# Patient Record
Sex: FEMALE | Race: BLACK OR AFRICAN AMERICAN | Employment: UNEMPLOYED | URBAN - METROPOLITAN AREA
[De-identification: names, ages, dates, MRNs, and addresses within clinical notes are randomized per-mention and may not be internally consistent; named-entity substitution may affect disease eponyms.]

---

## 2017-06-29 ENCOUNTER — HOSPITAL ENCOUNTER (INPATIENT)
Age: 22
LOS: 3 days | Discharge: HOME | DRG: 881 | End: 2017-07-03
Attending: EMERGENCY MEDICINE | Admitting: PSYCHIATRY & NEUROLOGY
Payer: COMMERCIAL

## 2017-06-29 DIAGNOSIS — R45.851 SUICIDAL IDEATION: ICD-10-CM

## 2017-06-29 DIAGNOSIS — F32.89 OTHER DEPRESSION: Primary | ICD-10-CM

## 2017-06-29 PROBLEM — F32.A DEPRESSION: Status: ACTIVE | Noted: 2017-06-29

## 2017-06-29 LAB
ALBUMIN SERPL BCP-MCNC: 3.5 G/DL (ref 3.5–5)
ALBUMIN/GLOB SERPL: 0.8 {RATIO} (ref 1.1–2.2)
ALP SERPL-CCNC: 67 U/L (ref 45–117)
ALT SERPL-CCNC: 19 U/L (ref 12–78)
AMPHET UR QL SCN: NEGATIVE
ANION GAP BLD CALC-SCNC: 7 MMOL/L (ref 5–15)
AST SERPL W P-5'-P-CCNC: 16 U/L (ref 15–37)
BARBITURATES UR QL SCN: NEGATIVE
BASOPHILS # BLD AUTO: 0 K/UL (ref 0–0.1)
BASOPHILS # BLD: 0 % (ref 0–1)
BENZODIAZ UR QL: NEGATIVE
BILIRUB SERPL-MCNC: 0.3 MG/DL (ref 0.2–1)
BUN SERPL-MCNC: 12 MG/DL (ref 6–20)
BUN/CREAT SERPL: 16 (ref 12–20)
CALCIUM SERPL-MCNC: 9.4 MG/DL (ref 8.5–10.1)
CANNABINOIDS UR QL SCN: NEGATIVE
CHLORIDE SERPL-SCNC: 104 MMOL/L (ref 97–108)
CO2 SERPL-SCNC: 27 MMOL/L (ref 21–32)
COCAINE UR QL SCN: NEGATIVE
CREAT SERPL-MCNC: 0.75 MG/DL (ref 0.55–1.02)
DRUG SCRN COMMENT,DRGCM: NORMAL
EOSINOPHIL # BLD: 0.1 K/UL (ref 0–0.4)
EOSINOPHIL NFR BLD: 1 % (ref 0–7)
ERYTHROCYTE [DISTWIDTH] IN BLOOD BY AUTOMATED COUNT: 12.2 % (ref 11.5–14.5)
ETHANOL SERPL-MCNC: <10 MG/DL
GLOBULIN SER CALC-MCNC: 4.3 G/DL (ref 2–4)
GLUCOSE SERPL-MCNC: 99 MG/DL (ref 65–100)
HCT VFR BLD AUTO: 39.3 % (ref 35–47)
HGB BLD-MCNC: 13.8 G/DL (ref 11.5–16)
LYMPHOCYTES # BLD AUTO: 40 % (ref 12–49)
LYMPHOCYTES # BLD: 2.2 K/UL (ref 0.8–3.5)
MCH RBC QN AUTO: 30.9 PG (ref 26–34)
MCHC RBC AUTO-ENTMCNC: 35.1 G/DL (ref 30–36.5)
MCV RBC AUTO: 87.9 FL (ref 80–99)
METHADONE UR QL: NEGATIVE
MONOCYTES # BLD: 0.5 K/UL (ref 0–1)
MONOCYTES NFR BLD AUTO: 9 % (ref 5–13)
NEUTS SEG # BLD: 2.7 K/UL (ref 1.8–8)
NEUTS SEG NFR BLD AUTO: 50 % (ref 32–75)
OPIATES UR QL: NEGATIVE
PCP UR QL: NEGATIVE
PLATELET # BLD AUTO: 261 K/UL (ref 150–400)
POTASSIUM SERPL-SCNC: 4 MMOL/L (ref 3.5–5.1)
PROT SERPL-MCNC: 7.8 G/DL (ref 6.4–8.2)
RBC # BLD AUTO: 4.47 M/UL (ref 3.8–5.2)
SODIUM SERPL-SCNC: 138 MMOL/L (ref 136–145)
WBC # BLD AUTO: 5.6 K/UL (ref 3.6–11)

## 2017-06-29 PROCEDURE — 85025 COMPLETE CBC W/AUTO DIFF WBC: CPT | Performed by: EMERGENCY MEDICINE

## 2017-06-29 PROCEDURE — 36415 COLL VENOUS BLD VENIPUNCTURE: CPT | Performed by: EMERGENCY MEDICINE

## 2017-06-29 PROCEDURE — 80053 COMPREHEN METABOLIC PANEL: CPT | Performed by: EMERGENCY MEDICINE

## 2017-06-29 PROCEDURE — 99285 EMERGENCY DEPT VISIT HI MDM: CPT

## 2017-06-29 PROCEDURE — 81001 URINALYSIS AUTO W/SCOPE: CPT | Performed by: EMERGENCY MEDICINE

## 2017-06-29 PROCEDURE — 80307 DRUG TEST PRSMV CHEM ANLYZR: CPT | Performed by: EMERGENCY MEDICINE

## 2017-06-29 PROCEDURE — 84443 ASSAY THYROID STIM HORMONE: CPT | Performed by: PSYCHIATRY & NEUROLOGY

## 2017-06-29 PROCEDURE — 90791 PSYCH DIAGNOSTIC EVALUATION: CPT

## 2017-06-29 PROCEDURE — 81025 URINE PREGNANCY TEST: CPT

## 2017-06-29 RX ORDER — THERA TABS 400 MCG
1 TAB ORAL DAILY
COMMUNITY
End: 2020-02-11

## 2017-06-29 NOTE — IP AVS SNAPSHOT
Current Discharge Medication List  
  
START taking these medications Dose & Instructions Dispensing Information Comments Morning Noon Evening Bedtime  
 hydrOXYzine HCl 10 mg tablet Commonly known as:  ATARAX Dose:  10 mg Take 1 Tab by mouth every four (4) hours as needed (Anxiety) for up to 10 days. Indications: anxiety Quantity:  30 Tab Refills:  0  
     
   
   
   
  
 sertraline 25 mg tablet Commonly known as:  ZOLOFT Your last dose was:  7/3/17 Your next dose is:  7/4/17 Dose:  25 mg Take 1 Tab by mouth daily. Indications: ANXIETY WITH DEPRESSION Quantity:  30 Tab Refills:  0 CONTINUE these medications which have NOT CHANGED Dose & Instructions Dispensing Information Comments Morning Noon Evening Bedtime  
 therapeutic multivitamin tablet Commonly known as:  [facility] Dose:  1 Tab Take 1 Tab by mouth daily. Indications: VITAMIN DEFICIENCY PREVENTION Refills:  0 Where to Get Your Medications Information on where to get these meds will be given to you by the nurse or doctor. ! Ask your nurse or doctor about these medications  
  hydrOXYzine HCl 10 mg tablet  
 sertraline 25 mg tablet

## 2017-06-29 NOTE — IP AVS SNAPSHOT
[facility] [facility] [address] [place] [address] 
[phone] Patient: Obey Thomas MRN: [id number] NVI:[id number]/99/[id number] You are allergic to the following No active allergies Recent Documentation Height Weight BMI Smoking Status 1.575 m 82.3 kg 33.18 kg/m2 Never Smoker Emergency Contacts Name Discharge Info Relation Home Work Mobile Elvis Shawn About your hospitalization You were admitted on:  June 30, 2017 You last received care in the:  [address] You were discharged on:  July 3, 2017 Unit phone number:  [phone] Why you were hospitalized Your primary diagnosis was:  Depression Providers Seen During Your Hospitalizations Provider Role Specialty Primary office phone Floyd Remy MD Attending Provider Emergency Medicine [phone] Mary Green MD Attending Provider Psychiatry [phone] Winnie Ray MD Attending Provider Psychiatry [phone] Your Primary Care Physician (PCP) Primary Care Physician Office Phone Office Fax NONE ** None ** ** None ** Follow-up Information Follow up With Details Comments Contact Info Dara Dorsey  Please call to schedule an appointment with your therapist. The office wishes to schedule appointments directly with the client. [facility] [address] 
[phone] [place], [place] On 7/5/2017 You have at 10:00am appointment with a physician to become enrolled in student health services. After this appointment, you will be scheduled to meet with a psychiatrist in the practice. [address] [facility] [address] [place] [address], [address] 
[phone] None   None (395) Patient stated that they have no PCP Current Discharge Medication List  
  
START taking these medications Dose & Instructions Dispensing Information Comments Morning Noon Evening Bedtime  
 hydrOXYzine HCl 10 mg tablet Commonly known as:  ATARAX Dose:  10 mg Take 1 Tab by mouth every four (4) hours as needed (Anxiety) for up to 10 days. Indications: anxiety Quantity:  30 Tab Refills:  0  
     
   
   
   
  
 sertraline 25 mg tablet Commonly known as:  ZOLOFT Your last dose was:  7/3/17 Your next dose is:  17 Dose:  25 mg Take 1 Tab by mouth daily. Indications: ANXIETY WITH DEPRESSION Quantity:  30 Tab Refills:  0 CONTINUE these medications which have NOT CHANGED Dose & Instructions Dispensing Information Comments Morning Noon Evening Bedtime  
 therapeutic multivitamin tablet Commonly known as:  [facility] Dose:  1 Tab Take 1 Tab by mouth daily. Indications: VITAMIN DEFICIENCY PREVENTION Refills:  0 Where to Get Your Medications Information on where to get these meds will be given to you by the nurse or doctor. ! Ask your nurse or doctor about these medications  
  hydrOXYzine HCl 10 mg tablet  
 sertraline 25 mg tablet Discharge Instructions [address] [facility] : 1995 MRN: [id number] The patient Cheyenne Rai exhibits the ability to control behavior in a less restrictive environment. Patient's level of functioning is improving. No assaultive/destructive behavior has been observed for the past 24 hours. No suicidal/homicidal threat or behavior has been observed for the past 24 hours. There is no evidence of serious medication side effects. Patient has not been in physical or protective restraints for at least the past 24 hours. If weapons involved, how are they secured? No weapons involved. Is patient aware of and in agreement with discharge plan? Yes Arrangements for medication:  Prescriptions given to patient. Copy of discharge instructions to  provider?:  Yes, [facility] ([phone]) Arrangements for transportation home:  Lex Blair to friend's home Keep all follow up appointments as scheduled, continue to take prescribed medications per physician instructions. Mental health crisis number:  162 or your local mental health crisis line number at [phone] [facility] or [phone] ([place]). DISCHARGE SUMMARY from Nurse The following personal items are in your possession at time of discharge: 
 
Dental Appliances: None Visual Aid: None Home Medications: None Jewelry: None Clothing: Undergarments (8 undies) Other Valuables: Personal toiletries, Other (comment) (notebook,laquita, toothbr/paste,face wipes) Personal Items Sent to Safe: none PATIENT INSTRUCTIONS: 
 
 
What to do at Home: 
Recommended activity: Activity as tolerated. If you experience any of the following symptoms thoughts of wanting to harm yourself or overwhelming anxiety, please follow up with 911 or your local mental health crisis line number at [phone] [facility] or [phone] ([place]). *  Please give a list of your current medications to your Primary Care Provider. *  Please update this list whenever your medications are discontinued, doses are 
    changed, or new medications (including over-the-counter products) are added. *  Please carry medication information at all times in case of emergency situations. These are general instructions for a healthy lifestyle: No smoking/ No tobacco products/ Avoid exposure to second hand smoke Surgeon General's Warning:  Quitting smoking now greatly reduces serious risk to your health. Obesity, smoking, and sedentary lifestyle greatly increases your risk for illness A healthy diet, regular physical exercise & weight monitoring are important for maintaining a healthy lifestyle You may be retaining fluid if you have a history of heart failure or if you experience any of the following symptoms:  Weight gain of 3 pounds or more overnight or 5 pounds in a week, increased swelling in our hands or feet or shortness of breath while lying flat in bed. Please call your doctor as soon as you notice any of these symptoms; do not wait until your next office visit. Recognize signs and symptoms of STROKE: 
 
F-face looks uneven A-arms unable to move or move unevenly S-speech slurred or non-existent T-time-call 911 as soon as signs and symptoms begin-DO NOT go Back to bed or wait to see if you get better-TIME IS BRAIN. Warning Signs of HEART ATTACK Call 911 if you have these symptoms: 
? Chest discomfort. Most heart attacks involve discomfort in the center of the chest that lasts more than a few minutes, or that goes away and comes back. It can feel like uncomfortable pressure, squeezing, fullness, or pain. ? Discomfort in other areas of the upper body. Symptoms can include pain or discomfort in one or both arms, the back, neck, jaw, or stomach. ? Shortness of breath with or without chest discomfort. ? Other signs may include breaking out in a cold sweat, nausea, or lightheadedness. Don't wait more than five minutes to call [address]! Fast action can save your life. Calling 911 is almost always the fastest way to get lifesaving treatment. Emergency Medical Services staff can begin treatment when they arrive  up to an hour sooner than if someone gets to the hospital by car. The discharge information has been reviewed with the patient. The patient verbalized understanding. Discharge medications reviewed with the patient and appropriate educational materials and side effects teaching were provided. Discharge Orders None [facility] Announcement We are excited to announce that we are making your provider's discharge notes available to you in Circa.   You will see these notes when they are completed and signed by the physician that discharged you from your recent hospital stay. If you have any questions or concerns about any information you see in Uberseq, please call the Health Information Department where you were seen or reach out to your Primary Care Provider for more information about your plan of care. Introducing [facility]! [employer] introduces Uberseq patient portal. Now you can access parts of your medical record, email your doctor's office, and request medication refills online. 1. In your internet browser, go to [email or web address] 2. Click on the First Time User? Click Here link in the Sign In box. You will see the New Member Sign Up page. 3. Enter your Uberseq Access Code exactly as it appears below. You will not need to use this code after youve completed the sign-up process. If you do not sign up before the expiration date, you must request a new code. · Uberseq Access Code: [email or web address] Expires: 9/27/2017  9:03 PM 
 
4. Enter the last four digits of your Social Security Number (xxxx) and Date of Birth (mm/dd/yyyy) as indicated and click Submit. You will be taken to the next sign-up page. 5. Create a Uberseq ID. This will be your Uberseq login ID and cannot be changed, so think of one that is secure and easy to remember. 6. Create a Uberseq password. You can change your password at any time. 7. Enter your Password Reset Question and Answer. This can be used at a later time if you forget your password. 8. Enter your e-mail address. You will receive e-mail notification when new information is available in [address] 9. Click Sign Up. You can now view and download portions of your medical record. 10. Click the Download Summary menu link to download a portable copy of your medical information.  
 
If you have questions, please visit the Frequently Asked Questions section of the [email or web address]. Remember, MyChart is NOT to be used for urgent needs. For medical emergencies, dial 911. Now available from your iPhone and Android! General Information Please provide this summary of care documentation to your next provider. Patient Signature:  ____________________________________________________________ Date:  ____________________________________________________________  
  
Fayrene Retort Provider Signature:  ____________________________________________________________ Date:  ____________________________________________________________
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
## 2017-06-29 NOTE — ED NOTES
Pt contracts for safety while in ER.     Pt changed into gown and belongings placed behind nurses station

## 2017-06-29 NOTE — IP AVS SNAPSHOT
Summary of Care Report The Summary of Care report has been created to help improve care coordination. Users with access to [employer] or [address] (Web-based application) may access additional patient information including the Discharge Summary. If you are not currently a [address] user and need more information, please call the number listed below in the [place] [address] section and ask to be connected with Medical Records. Facility Information Name Address Phone [address] [facility] [address] [phone] Patient Information Patient Name Sex  Sumaya Mullins ([id number]) Female 1995 Discharge Information Admitting Provider Service Area Unit Shahnaz Harvey MD / [address]  / [phone] Discharge Provider Discharge Date/Time Discharge Disposition Destination Betty Presley MD / [phone] 17 1413 [facility] (none) Patient Language Language ENGLISH [13] Hospital Problems as of 7/3/2017  Never Reviewed Class Noted - Resolved Last Modified POA Active Problems * (Principal)Depression  2017 - Present 2017 by Betty Presley MD Unknown Entered by Shahnaz Harvey MD  
  
You are allergic to the following No active allergies Current Discharge Medication List  
  
START taking these medications Dose & Instructions Dispensing Information Comments  
 hydrOXYzine HCl 10 mg tablet Commonly known as:  ATARAX Dose:  10 mg Take 1 Tab by mouth every four (4) hours as needed (Anxiety) for up to 10 days. Indications: anxiety Quantity:  30 Tab Refills:  0  
   
 sertraline 25 mg tablet Commonly known as:  ZOLOFT Dose:  25 mg Take 1 Tab by mouth daily. Indications: ANXIETY WITH DEPRESSION Quantity:  30 Tab Refills:  0 CONTINUE these medications which have NOT CHANGED Dose & Instructions Dispensing Information Comments  
 therapeutic multivitamin tablet Commonly known as:  [facility] Dose:  1 Tab Take 1 Tab by mouth daily. Indications: VITAMIN DEFICIENCY PREVENTION Refills:  0 Follow-up Information Follow up With Details Comments Contact Info Mikey Castro  Please call to schedule an appointment with your therapist. The office wishes to schedule appointments directly with the client. [facility] [address], [address] [facility] [address] [place] [address] 
[phone] Kassie Zayas, [address] Kimberly Rodríguez On 2017 You have at 10:00am appointment with a physician to become enrolled in [facility]). After this appointment, you will be scheduled to meet with a psychiatrist in the practice. [address] [place] [address], [address] [facility] [address] [place] [address] 
[phone] None   None (395) Patient stated that they have no PCP Discharge Instructions [address] [facility] : 1995 MRN: [id number] The patient Mariam Ramos exhibits the ability to control behavior in a less restrictive environment. Patient's level of functioning is improving. No assaultive/destructive behavior has been observed for the past 24 hours. No suicidal/homicidal threat or behavior has been observed for the past 24 hours. There is no evidence of serious medication side effects. Patient has not been in physical or protective restraints for at least the past 24 hours. If weapons involved, how are they secured? No weapons involved. Is patient aware of and in agreement with discharge plan? Yes Arrangements for medication:  Prescriptions given to patient. Copy of discharge instructions to  provider?:  Yes, [address] [place] [address] ([phone]) Arrangements for transportation home:  Seth Leiva to friend's home Keep all follow up appointments as scheduled, continue to take prescribed medications per physician instructions. Mental health crisis number:  055 or your local mental health crisis line number at [phone] [facility] or [phone] ([place]). DISCHARGE SUMMARY from Nurse The following personal items are in your possession at time of discharge: 
 
Dental Appliances: None Visual Aid: None Home Medications: None Jewelry: None Clothing: Undergarments (8 undies) Other Valuables: Personal toiletries, Other (comment) (notebook,laquita, toothbr/paste,face wipes) Personal Items Sent to Safe: none PATIENT INSTRUCTIONS: 
 
 
What to do at Home: 
Recommended activity: Activity as tolerated. If you experience any of the following symptoms thoughts of wanting to harm yourself or overwhelming anxiety, please follow up with 911 or your local mental health crisis line number at [phone] [facility] or [phone] ([place]). *  Please give a list of your current medications to your Primary Care Provider. *  Please update this list whenever your medications are discontinued, doses are 
    changed, or new medications (including over-the-counter products) are added. *  Please carry medication information at all times in case of emergency situations. These are general instructions for a healthy lifestyle: No smoking/ No tobacco products/ Avoid exposure to second hand smoke Surgeon General's Warning:  Quitting smoking now greatly reduces serious risk to your health. Obesity, smoking, and sedentary lifestyle greatly increases your risk for illness A healthy diet, regular physical exercise & weight monitoring are important for maintaining a healthy lifestyle You may be retaining fluid if you have a history of heart failure or if you experience any of the following symptoms:  Weight gain of 3 pounds or more overnight or 5 pounds in a week, increased swelling in our hands or feet or shortness of breath while lying flat in bed. Please call your doctor as soon as you notice any of these symptoms; do not wait until your next office visit. Recognize signs and symptoms of STROKE: 
 
F-face looks uneven A-arms unable to move or move unevenly S-speech slurred or non-existent T-time-call 911 as soon as signs and symptoms begin-DO NOT go Back to bed or wait to see if you get better-TIME IS BRAIN. Warning Signs of HEART ATTACK Call 911 if you have these symptoms: 
? Chest discomfort. Most heart attacks involve discomfort in the center of the chest that lasts more than a few minutes, or that goes away and comes back. It can feel like uncomfortable pressure, squeezing, fullness, or pain. ? Discomfort in other areas of the upper body. Symptoms can include pain or discomfort in one or both arms, the back, neck, jaw, or stomach. ? Shortness of breath with or without chest discomfort. ? Other signs may include breaking out in a cold sweat, nausea, or lightheadedness. Don't wait more than five minutes to call [address]! Fast action can save your life. Calling 911 is almost always the fastest way to get lifesaving treatment. Emergency Medical Services staff can begin treatment when they arrive  up to an hour sooner than if someone gets to the hospital by car. The discharge information has been reviewed with the patient. The patient verbalized understanding. Discharge medications reviewed with the patient and appropriate educational materials and side effects teaching were provided. Chart Review Routing History No Routing History on File

## 2017-06-29 NOTE — ED PROVIDER NOTES
HPI Comments: 23 y.o. female with no significant past medical history who presents from counselor's office via EMS with chief complaint of suicidal ideations. Patient states she feels as if \"I've built up things and haven't been able to release anything properly. \" Patient has multiple plans. Patient lists stressors as \"school, work, possibly disappointing my family, and the deterioration of my friendships. \" Patient states she does not have much of a support system. Patient states \"my parents are really Nondenominational, they don't know that I see a therapist.\" Patient states she has a roommate while taking summer classes. There are no other acute medical concerns at this time. PCP: None    Note written by Jeaneth Prater, as dictated by Kyle Pimentel MD 7:17 PM      The history is provided by the patient. No  was used. No past medical history on file. No past surgical history on file. No family history on file. Social History     Social History    Marital status: SINGLE     Spouse name: N/A    Number of children: N/A    Years of education: N/A     Occupational History    Not on file. Social History Main Topics    Smoking status: Not on file    Smokeless tobacco: Not on file    Alcohol use Not on file    Drug use: Not on file    Sexual activity: Not on file     Other Topics Concern    Not on file     Social History Narrative         ALLERGIES: Review of patient's allergies indicates no known allergies. Review of Systems   Constitutional: Negative for appetite change, chills and fever. HENT: Negative for rhinorrhea, sore throat and trouble swallowing. Eyes: Negative for photophobia. Respiratory: Negative for cough and shortness of breath. Cardiovascular: Negative for chest pain and palpitations. Gastrointestinal: Negative for abdominal pain, nausea and vomiting. Genitourinary: Negative for dysuria, frequency and hematuria.    Musculoskeletal: Negative for arthralgias. Neurological: Negative for dizziness, syncope and weakness. Psychiatric/Behavioral: Positive for suicidal ideas. Negative for behavioral problems. The patient is nervous/anxious. Vitals:    06/29/17 1817   BP: 113/71   Pulse: 76   Resp: 18   Temp: 99 °F (37.2 °C)   SpO2: 99%            Physical Exam   Constitutional: She appears well-developed and well-nourished. HENT:   Head: Normocephalic and atraumatic. Mouth/Throat: Oropharynx is clear and moist.   Eyes: EOM are normal. Pupils are equal, round, and reactive to light. Neck: Normal range of motion. Neck supple. Cardiovascular: Normal rate, regular rhythm, normal heart sounds and intact distal pulses. Exam reveals no gallop and no friction rub. No murmur heard. Pulmonary/Chest: Effort normal. No respiratory distress. She has no wheezes. She has no rales. Abdominal: Soft. There is no tenderness. There is no rebound. Musculoskeletal: Normal range of motion. She exhibits no tenderness. Neurological: She is alert. No cranial nerve deficit. Motor; symmetric   Skin: No erythema. Psychiatric: She has a normal mood and affect. Her speech is normal and behavior is normal. She expresses suicidal ideation. She expresses suicidal plans. Nursing note and vitals reviewed.   Note written by Jeaneth Maddox, as dictated by Van Lynne MD 7:17 PM      [facility]  ED Course       Procedures

## 2017-06-29 NOTE — BSMART NOTE
Comprehensive Assessment Form Part 1      Section I - Disposition    Axis I - Depression Unspecified   Axis II - Deferred  Axis III - No past medical history on file. [place] IV - Relatinship issues, body image issues, family issues, school and occupational stress, financial concerns  [place] V - 28      The Medical Doctor to Psychiatrist conference was not completed. The Medical Doctor is in agreement with Psychiatrist disposition because of (reason) Voluntary admission. The plan is admit patient. The on-call Psychiatrist consulted was Dr. Anastasiia Miller. The admitting Psychiatrist will be Dr. Lyndsey Conklin. The admitting Diagnosis is Depression. The Payor source is [facility]. Section II - Integrated Summary  Summary:  Patient came in after seeing her therapist today and therapist felt she needed to be admitted due to depression and suicidal ideation. Patient indicated the therapist expressed concern when she didn't return a call to her and also when the patient told her that the problems discussed were worse rather than better. Patient indicated she is seeing Talat Aguirre at [facility]. Patient saw her for second visit today per report. Patient denied medications or prior admissions. Patient reported multiple stressors to include family issues, school stress, work stress, relationship issues, body image issues, financial stressors, and an overall \"bad feeling about the future. \"  Patient is alert, oriented, and cooperative with assessment. Speech is clear and coherent. Patient is verbalizing SI with various plans but primarily to drown herself at [facility] where the current is stronger. Patient indicated she would go in the morning or evening. Patient also reported there are other thoughts such as the pool at home, cutting, traffic, driving off bridge, or overdose. Patient reported taking a handfull of Advill pm a few weeks ago when she couldn't distract herself and took pills impulsively.   Patient stated \"it would be easier, make things better\" when referring to suicide. Patient denied HI. Patient is not actively psychotic or using substances. Patient is concerned about the hospital bill and called parents to consult with them. The parents were unaware of what has been going on with patient. They advised her to be admitted but wanted to make sure this facility was in network. Attempted to call James Commander and was told the Utilization Review Department was open 24 hours however, this clinician kept getting voice mail menus with options that were not appropriate for situation. Called an alternative number on card and received message that the office was closed and to call back tomorrow. Advised patient that this could be explored further tomorrow. The patienthas demonstrated mental capacity to provide informed consent. The information is given by the patient. The Chief Complaint is depression and SI. The Precipitant Factors are multiple, relationship, family, work, school, and financial.  Previous Hospitalizations: NA  The patient has not previously been in restraints. Current Psychiatrist and/or  is Caroline Tabares. Lethality Assessment:    The potential for suicide noted by the following: defined plan and ideation . Overdose 3 weeks ago on Advil pm.  The potential for homicide is not noted. The patient has not been a perpetrator of sexual or physical abuse. There are not pending charges. The patient is felt to be at risk for self harm or harm to others. The attending nurse was advised that security has not been notified. Section III - Psychosocial  The patient's overall mood and attitude is depressed. Feelings of helplessness and hopelessness are observed by verbal statements. Generalized anxiety is not observed. Panic is not observed. Phobias are not observed. Obsessive compulsive tendencies are not observed.       Section IV - Mental Status Exam  The patient's appearance shows no evidence of impairment. The patient's behavior shows no evidence of impairment. The patient is oriented to time, place, person and situation. The patient's speech shows no evidence of impairment. The patient's mood is depressed. The range of affect is flat. The patient's thought content demonstrates no evidence of impairment. The thought process shows no evidence of impairment. The patient's perception shows no evidence of impairment. The patient's memory shows no evidence of impairment. The patient's appetite shows no evidence of impairment. The patient's sleep shows no evidence of impairment. The patient shows no insight. The patient's judgement is psychologically impaired. Section V - Substance Abuse  The patient is not using substances. Section VI - Living Arrangements  The patient is single. The patient lives with a roommate. The patient has no children. The patient does plan to return home upon discharge. The patient does not have legal issues pending. The patient's source of income comes from employment and family. Christianity and cultural practices have not been voiced at this time. The patient's greatest support comes from cousin in [place] and mother in [place] and this person will not be involved with the treatment. The patient has not been in an event described as horrible or outside the realm of ordinary life experience either currently or in the past.  The patient has not been a victim of sexual/physical abuse. Section VII - Other Areas of Clinical Concern  The highest grade achieved is college with the overall quality of school experience being described as NA. The patient is currently employed and speaks [place] as a primary language. The patient has no communication impairments affecting communication. The patient's preference for learning can be described as: can read and write adequately.   The patient's hearing is normal.  The patient's vision is normal.      Sanjay Yolande, LPC

## 2017-06-30 LAB
APPEARANCE UR: CLEAR
BACTERIA URNS QL MICRO: ABNORMAL /HPF
BILIRUB UR QL: NEGATIVE
COLOR UR: ABNORMAL
EPITH CASTS URNS QL MICRO: ABNORMAL /LPF
GLUCOSE UR STRIP.AUTO-MCNC: NEGATIVE MG/DL
HCG UR QL: NEGATIVE
HGB UR QL STRIP: NEGATIVE
HYALINE CASTS URNS QL MICRO: ABNORMAL /LPF (ref 0–5)
KETONES UR QL STRIP.AUTO: NEGATIVE MG/DL
LEUKOCYTE ESTERASE UR QL STRIP.AUTO: NEGATIVE
NITRITE UR QL STRIP.AUTO: NEGATIVE
PH UR STRIP: 7.5 [PH] (ref 5–8)
PROT UR STRIP-MCNC: NEGATIVE MG/DL
RBC #/AREA URNS HPF: ABNORMAL /HPF (ref 0–5)
SP GR UR REFRACTOMETRY: 1.02 (ref 1–1.03)
TSH SERPL DL<=0.05 MIU/L-ACNC: 1.03 UIU/ML (ref 0.36–3.74)
UROBILINOGEN UR QL STRIP.AUTO: 0.2 EU/DL (ref 0.2–1)
WBC URNS QL MICRO: ABNORMAL /HPF (ref 0–4)

## 2017-06-30 PROCEDURE — 74011250637 HC RX REV CODE- 250/637: Performed by: PSYCHIATRY & NEUROLOGY

## 2017-06-30 PROCEDURE — 65220000003 HC RM SEMIPRIVATE PSYCH

## 2017-06-30 RX ORDER — IBUPROFEN 400 MG/1
400 TABLET ORAL
Status: DISCONTINUED | OUTPATIENT
Start: 2017-06-30 | End: 2017-07-03 | Stop reason: HOSPADM

## 2017-06-30 RX ORDER — HYDROXYZINE HYDROCHLORIDE 10 MG/1
10 TABLET, FILM COATED ORAL
Status: DISCONTINUED | OUTPATIENT
Start: 2017-06-30 | End: 2017-07-03 | Stop reason: HOSPADM

## 2017-06-30 RX ORDER — BENZTROPINE MESYLATE 2 MG/1
2 TABLET ORAL
Status: DISCONTINUED | OUTPATIENT
Start: 2017-06-30 | End: 2017-07-03 | Stop reason: HOSPADM

## 2017-06-30 RX ORDER — ZOLPIDEM TARTRATE 5 MG/1
5 TABLET ORAL
Status: DISCONTINUED | OUTPATIENT
Start: 2017-06-30 | End: 2017-07-03 | Stop reason: HOSPADM

## 2017-06-30 RX ORDER — OLANZAPINE 5 MG/1
5 TABLET ORAL
Status: DISCONTINUED | OUTPATIENT
Start: 2017-06-30 | End: 2017-07-03 | Stop reason: HOSPADM

## 2017-06-30 RX ORDER — ADHESIVE BANDAGE
30 BANDAGE TOPICAL DAILY PRN
Status: DISCONTINUED | OUTPATIENT
Start: 2017-06-30 | End: 2017-07-03 | Stop reason: HOSPADM

## 2017-06-30 RX ORDER — ACETAMINOPHEN 325 MG/1
650 TABLET ORAL
Status: DISCONTINUED | OUTPATIENT
Start: 2017-06-30 | End: 2017-07-03 | Stop reason: HOSPADM

## 2017-06-30 RX ORDER — LORAZEPAM 2 MG/ML
2 INJECTION INTRAMUSCULAR
Status: DISCONTINUED | OUTPATIENT
Start: 2017-06-30 | End: 2017-07-03 | Stop reason: HOSPADM

## 2017-06-30 RX ORDER — SERTRALINE HYDROCHLORIDE 50 MG/1
25 TABLET, FILM COATED ORAL DAILY
Status: DISCONTINUED | OUTPATIENT
Start: 2017-06-30 | End: 2017-07-03 | Stop reason: HOSPADM

## 2017-06-30 RX ORDER — LORAZEPAM 1 MG/1
1 TABLET ORAL
Status: DISCONTINUED | OUTPATIENT
Start: 2017-06-30 | End: 2017-06-30

## 2017-06-30 RX ORDER — BENZTROPINE MESYLATE 1 MG/ML
2 INJECTION INTRAMUSCULAR; INTRAVENOUS
Status: DISCONTINUED | OUTPATIENT
Start: 2017-06-30 | End: 2017-07-03 | Stop reason: HOSPADM

## 2017-06-30 RX ADMIN — SERTRALINE HYDROCHLORIDE 25 MG: 50 TABLET ORAL at 14:08

## 2017-06-30 NOTE — BH NOTES
Primary Nurse Freddy Polanco RN and Carlos Lakhani RN performed a dual skin assessment on this patient No impairment noted  Kenan score is 23    Pt has 1 tattoo on right lateral side (well healed). No rashes or wounds found. Pressure Injury Documentation  (COMPLETE ONE LABEL PER PRESSURE INJURY)  For further information, please review corresponding Wound Care flowsheet. Pressure Ulcer Prevention Protocol initiated.     Dalia Washington RN

## 2017-06-30 NOTE — PROGRESS NOTES
Problem: Depressed Mood (Adult/Pediatric)  Goal: *Participates in Treatment Plan  Outcome: Progressing Towards Goal  1530: Greeted patient on unit in day room visiting with peers. Appeared in no acute distress. No voiced concerns at present. Will continue to monitor on Q 15 minute safety checks.

## 2017-06-30 NOTE — BH NOTES
PSYCHOSOCIAL ASSESSMENT  :Patient identifying info:  Antonia Jarquin is a 23 y.o., female admitted 2017  6:16 PM     Presenting problem and precipitating factors: Pt came to [address] [place] [address] ED after meeting with her therapist and endorsing SI with a plan to drown herself at [facility]. Pt has also thought about drowning at the pool, driving off a bridge, and attempted an overdose on Advil PM a few weeks ago. Pt stated she has a \"bad feeling about the future\" and that suicide would be an easy way out. Pt identified feeling overwhelmed, stressed, dissatisfied with her body, and feeling as though she couldn't talk to her family (who are very religous and don't believe in mental illness). Current psychiatric providers and contact info: Natalie Merino (therapist)    Previous psychiatric services/providers and response to treatment: None      Substance abuse history:  None  Social History   Substance Use Topics    Smoking status: Not on file    Smokeless tobacco: Not on file    Alcohol use Not on file       Family constellation: Mom, Dad    Is significant other involved?  No      Describe support system: Roommate, family    Describe living arrangements and home environment: Lives with roommate  Health issues:   Hospital Problems  Never Reviewed          Codes Class Noted POA    Depression ICD-10-CM: F32.9  ICD-9-CM: 311  2017 Unknown              Trauma history: None indicated    Legal issues: None indicated    History of  service: No    Financial status: Employed    Episcopal/cultural factors: Patient comes from a very Temple family (family does not believe in mental illness or therapy)    Education/work history: Currently a latricia in college at [employer]    Have you been licensed as a gisel care professional (current or ): No  Leisure and recreation preferences: School, work  Describe coping skills: Limited    Heather Snow  2017

## 2017-06-30 NOTE — BH NOTES
GROUP THERAPY PROGRESS NOTE    Antonia Jarquin participated in a Process Group on the General Unit with a focus identifying feelings, planning for the day, and learning more about DBT concepts of \"Elf Help\" and the importance of self-care. Group time: 65 minutes. Personal goal for participation: To identify feelings and increase the capacity to take care of oneself first as a primary coping skill. Goal orientation: The patient will be able to introduce themselves to their peers, identify their feelings, and consider the importance of taking time for ones self-care as an important part of life planning and coping skill development. Group therapy participation: With minimal prompting, this patient actively participated in the group. Therapeutic interventions reviewed and discussed: The group members were asked to begin by introducing themselves and sharing about their feelings. They were then asked to  take turns reading from an outline of twenty-two behavioral suggestions from DBT, called the [address] [place] [address], were reviewed with the group members and discussed as a group. The suggestions came with drawings of elves engaged in the activities described. These suggestions included: 1) trusting yourself (wise mind); 2) take a day off to retreat (observe and describe); 3) talk and play with children or adults (participate); 4) when you cant stop thinking, feel (non-judgmental stance); 5) stay in the present by recognizing when anxiety gets you caught up in a future or a past (one mindfully); 6) take time to think before just jumping in to solve a problem (efficiently);  7) when angry, let yourself feel the anger (the function of emotion); 8) make time for play (adult pleasant activities); 9) when sad, think about what would be comforting (accepting and engaging the opposite); 10) put yourself first (self-soothe); 11) when uncomfortable being alone, think about being with others and decide whether to make it happen (improve imagery); 12) take time to wonder (improve meaning); 13) carve out time to be lazy (improve relaxation); 14) notice the sun and the moon as they rise and set (improve one thing at a time); 15) nothing is usually the hardest thing to do  but often it is the best (guidelines for accepting reality); 16) when things are in chaos and one is in a frenzy, ask yourself Ronn Dillon is right about now?  (turning your mind); 17) learn to stand back and let others take their turn as leaders (radical acceptance); 18) when someone turns you down, it usually has to do with them and not you  ask someone else for what you need (using objective effectiveness); 19) when wanting to talk with someone new and are scared, breathe  dont rehearse, just plunge and if it doesnt go well, you can stop (using relationship effectiveness); 20) when you see someone elses hurt face, breathe  you are not responsible for making other people happy (no apologies); 21) when you want something from someone else, ask  asking is being true to yourself (be truthful); 22) when you are hurt, tell the person who hurt you (situations for interpersonal effectiveness). The group members were provided a summary sheet of the DBT information discussed, which they could also review on their own time. Impression of participation: The patient said she was very anxious and depressed. She claimed to be slightly better than on admission because she did not have any current SI/HI nor overt psychotic symptoms. She added that she did feel like she has \"no future. \" She was told that this is one of the hallmarks of a depression. She was alert, appeared oriented, and cooperative. She also appeared to track the conversation and read a couple of times from the handout. This was the patient's first process group with the undersigned.

## 2017-06-30 NOTE — PROGRESS NOTES
TRANSFER - IN REPORT:    Verbal report received from [id number] [facility] RN(name) on Vidya Dumont  being received from ED(unit) for routine progression of care      Report consisted of patients Situation, Background, Assessment and   Recommendations(SBAR). Information from the following report(s) SBAR was reviewed with the receiving nurse. Opportunity for questions and clarification was provided. Assessment completed upon patients arrival to unit and care assumed.

## 2017-06-30 NOTE — ED NOTES
TRANSFER - OUT REPORT:    Verbal report given to Corwin Ross RN(name) on [employer]  being transferred to (unit) for routine progression of care       Report consisted of patients Situation, Background, Assessment and   Recommendations(SBAR). Information from the following report(s) SBAR and MAR was reviewed with the receiving nurse. Lines:       Opportunity for questions and clarification was provided.       Patient transported with:   Registered Nurse   TOMER

## 2017-06-30 NOTE — H&P
INITIAL PSYCHIATRIC EVALUATION         IDENTIFICATION:    Patient Name  Liam Pimentel   Date of Birth 1995   [facility] [id number]   Medical Record Number  [id number]      Age  23 y.o. PCP None   Admit date:  6/29/2017    Room Number  748/02  @ [facility]   Date of Service  6/30/2017            HISTORY         REASON FOR HOSPITALIZATION:  CC: \"depression\". Pt admitted under a voluntary basis for severe depression with suicidal ideations  an imminent danger to self and others and an inability to care for self. HISTORY OF PRESENT ILLNESS:    The patient, Liam Pimentel, is a 23 y.o. BLACK OR  female with a past psychiatric history significant for depression , who presents at this time with complaints of (and/or evidence of) the following emotional symptoms: depression. Additional symptomatology include anxiety. The above symptoms have been present for 3 weeks . These symptoms are of severe severity. These symptoms are constant in nature. The patient's condition has been precipitated by academic stress and psychosocial stressors (financial problems ). Patient's condition made worse by treatment noncompliance. UDS: negative; BAL=0. ALLERGIES:  No Known Allergies   MEDICATIONS PRIOR TO ADMISSION:  Prescriptions Prior to Admission   Medication Sig    therapeutic multivitamin (THERAGRAN) tablet Take 1 Tab by mouth daily. Indications: VITAMIN DEFICIENCY PREVENTION      PAST MEDICAL HISTORY:  No past medical history on file. No past surgical history on file. SOCIAL HISTORY:    Social History     Social History    Marital status: SINGLE     Spouse name: N/A    Number of children: N/A    Years of education: N/A     Occupational History    Not on file.      Social History Main Topics    Smoking status: Not on file    Smokeless tobacco: Not on file    Alcohol use Not on file    Drug use: Not on file    Sexual activity: Not on file     Other Topics Concern    Not on file Social History Narrative    23year old AA female admitted voluntarily for \"depression and anxiety\". Patient has never been admitted psychiatrically. She has had 2 sessions with an outpatient therapist. 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 She is single aND LIVES WITH A ROOM MATE. Pt. is from [address] [place] [address]: History reviewed. No pertinent family history. No family history on file. REVIEW OF SYSTEMS:   Psychological ROS: positive for - depression  Respiratory ROS: no cough, shortness of breath, or wheezing  Cardiovascular ROS: no chest pain or dyspnea on exertion  Pertinent items are noted in the History of Present Illness. All other Systems reviewed and are considered negative. MENTAL STATUS EXAM & VITALS         MENTAL STATUS EXAM (MSE):    MSE FINDINGS ARE WITHIN NORMAL LIMITS (WNL) UNLESS OTHERWISE STATED BELOW. ( ALL OF THE BELOW CATEGORIES OF THE MSE HAVE BEEN REVIEWED (reviewed 6/30/2017) AND UPDATED AS DEEMED APPROPRIATE )  General Presentation age appropriate, cooperative   Orientation oriented to time, place and person   Vital Signs  See below (reviewed 6/30/2017); Vital Signs (BP, Pulse, & Temp) are within normal limits if not listed below.    Gait and Station Stable/steady, no ataxia   Musculoskeletal System No extrapyramidal symptoms (EPS); no abnormal muscular movements or Tardive Dyskinesia (TD); muscle strength and tone are within normal limits   Language No aphasia or dysarthria   Speech:  monotone   Thought Processes logical; normal rate of thoughts; poor abstract reasoning/computation   Thought Associations goal directed   Thought Content free of delusions   Suicidal Ideations none   Homicidal Ideations none   Mood:  anxious    Affect:  mood-congruent   Memory recent  fair   Memory remote:  fair   Concentration/Attention:  hypervigilance   Fund of Knowledge average   Insight:  poor   Reliability fair   Judgment:  limited            VITALS:     Patient Vitals for the past 24 hrs:   Temp Pulse Resp BP SpO2   06/30/17 0808 98.3 °F (36.8 °C) 83 16 115/73 100 %   06/30/17 0015 98.6 °F (37 °C) 78 16 130/80 98 %   06/29/17 1817 99 °F (37.2 °C) 76 18 113/71 99 %     Wt Readings from Last 3 Encounters:   06/30/17 81.2 kg (179 lb)     Temp Readings from Last 3 Encounters:   06/30/17 98.3 °F (36.8 °C)     BP Readings from Last 3 Encounters:   06/30/17 115/73     Pulse Readings from Last 3 Encounters:   06/30/17 83            DATA       LABORATORY DATA:  Labs Reviewed   METABOLIC PANEL, COMPREHENSIVE - Abnormal; Notable for the following:        Result Value    Globulin 4.3 (*)     A-G Ratio 0.8 (*)     All other components within normal limits   URINALYSIS W/MICROSCOPIC - Abnormal; Notable for the following:     Bacteria 1+ (*)     All other components within normal limits   CBC WITH AUTOMATED DIFF   DRUG SCREEN, URINE   ETHYL ALCOHOL   SAMPLES BEING HELD   TSH 3RD GENERATION   HCG URINE, QL. - POC   POC URINE PREGNANCY TEST     Admission on 06/29/2017   Component Date Value Ref Range Status    WBC 06/29/2017 5.6  3.6 - 11.0 K/uL Final    RBC 06/29/2017 4.47  3.80 - 5.20 M/uL Final    HGB 06/29/2017 13.8  11.5 - 16.0 g/dL Final    HCT 06/29/2017 39.3  35.0 - 47.0 % Final    MCV 06/29/2017 87.9  80.0 - 99.0 FL Final    MCH 06/29/2017 30.9  26.0 - 34.0 PG Final    MCHC 06/29/2017 35.1  30.0 - 36.5 g/dL Final    RDW 06/29/2017 12.2  11.5 - 14.5 % Final    PLATELET 52/13/6770 790  150 - 400 K/uL Final    NEUTROPHILS 06/29/2017 50  32 - 75 % Final    LYMPHOCYTES 06/29/2017 40  12 - 49 % Final    MONOCYTES 06/29/2017 9  5 - 13 % Final    EOSINOPHILS 06/29/2017 1  0 - 7 % Final    BASOPHILS 06/29/2017 0  0 - 1 % Final    ABS. NEUTROPHILS 06/29/2017 2.7  1.8 - 8.0 K/UL Final    ABS. LYMPHOCYTES 06/29/2017 2.2  0.8 - 3.5 K/UL Final    ABS. MONOCYTES 06/29/2017 0.5  0.0 - 1.0 K/UL Final    ABS. EOSINOPHILS 06/29/2017 0.1  0.0 - 0.4 K/UL Final    ABS.  BASOPHILS 06/29/2017 0.0  0.0 - 0.1 K/UL Final    Sodium 06/29/2017 138  136 - 145 mmol/L Final    Potassium 06/29/2017 4.0  3.5 - 5.1 mmol/L Final    Chloride 06/29/2017 104  97 - 108 mmol/L Final    CO2 06/29/2017 27  21 - 32 mmol/L Final    Anion gap 06/29/2017 7  5 - 15 mmol/L Final    Glucose 06/29/2017 99  65 - 100 mg/dL Final    BUN 06/29/2017 12  6 - 20 MG/DL Final    Creatinine 06/29/2017 0.75  0.55 - 1.02 MG/DL Final    BUN/Creatinine ratio 06/29/2017 16  12 - 20   Final    GFR est AA 06/29/2017 >60  >60 ml/min/1.73m2 Final    GFR est non-AA 06/29/2017 >60  >60 ml/min/1.73m2 Final    Calcium 06/29/2017 9.4  8.5 - 10.1 MG/DL Final    Bilirubin, total 06/29/2017 0.3  0.2 - 1.0 MG/DL Final    ALT (SGPT) 06/29/2017 19  12 - 78 U/L Final    AST (SGOT) 06/29/2017 16  15 - 37 U/L Final    Alk.  phosphatase 06/29/2017 67  45 - 117 U/L Final    Protein, total 06/29/2017 7.8  6.4 - 8.2 g/dL Final    Albumin 06/29/2017 3.5  3.5 - 5.0 g/dL Final    Globulin 06/29/2017 4.3* 2.0 - 4.0 g/dL Final    A-G Ratio 06/29/2017 0.8* 1.1 - 2.2   Final    AMPHETAMINE 06/29/2017 NEGATIVE   NEG   Final    BARBITURATES 06/29/2017 NEGATIVE   NEG   Final    BENZODIAZEPINE 06/29/2017 NEGATIVE   NEG   Final    COCAINE 06/29/2017 NEGATIVE   NEG   Final    METHADONE 06/29/2017 NEGATIVE   NEG   Final    OPIATES 06/29/2017 NEGATIVE   NEG   Final    PCP(PHENCYCLIDINE) 06/29/2017 NEGATIVE   NEG   Final    THC (TH-CANNABINOL) 06/29/2017 NEGATIVE   NEG   Final    Drug screen comment 06/29/2017 (NOTE)   Final    ALCOHOL(ETHYL),SERUM 06/29/2017 <10  <10 MG/DL Final    Color 06/29/2017 YELLOW/STRAW    Final    Appearance 06/29/2017 CLEAR  CLEAR   Final    Specific gravity 06/29/2017 1.018  1.003 - 1.030   Final    pH (UA) 06/29/2017 7.5  5.0 - 8.0   Final    Protein 06/29/2017 NEGATIVE   NEG mg/dL Final    Glucose 06/29/2017 NEGATIVE   NEG mg/dL Final    Ketone 06/29/2017 NEGATIVE   NEG mg/dL Final    Bilirubin 06/29/2017 NEGATIVE NEG   Final    Blood 06/29/2017 NEGATIVE   NEG   Final    Urobilinogen 06/29/2017 0.2  0.2 - 1.0 EU/dL Final    Nitrites 06/29/2017 NEGATIVE   NEG   Final    Leukocyte Esterase 06/29/2017 NEGATIVE   NEG   Final    WBC 06/29/2017 0-4  0 - 4 /hpf Final    RBC 06/29/2017 0-5  0 - 5 /hpf Final    Epithelial cells 06/29/2017 FEW  FEW /lpf Final    Bacteria 06/29/2017 1+* NEG /hpf Final    Hyaline cast 06/29/2017 0-2  0 - 5 /lpf Final    Pregnancy test,urine (POC) 06/29/2017 NEGATIVE   NEG   Final    TSH 06/29/2017 1.03  0.36 - 3.74 uIU/mL Final        RADIOLOGY REPORTS:  No results found for this or any previous visit. No results found.            MEDICATIONS       ALL MEDICATIONS  Current Facility-Administered Medications   Medication Dose Route Frequency    ziprasidone (GEODON) 20 mg in sterile water (preservative free) 1 mL injection  20 mg IntraMUSCular BID PRN    OLANZapine (ZyPREXA) tablet 5 mg  5 mg Oral Q6H PRN    benztropine (COGENTIN) tablet 2 mg  2 mg Oral BID PRN    benztropine (COGENTIN) injection 2 mg  2 mg IntraMUSCular BID PRN    LORazepam (ATIVAN) injection 2 mg  2 mg IntraMUSCular Q4H PRN    LORazepam (ATIVAN) tablet 1 mg  1 mg Oral Q4H PRN    zolpidem (AMBIEN) tablet 5 mg  5 mg Oral QHS PRN    acetaminophen (TYLENOL) tablet 650 mg  650 mg Oral Q4H PRN    ibuprofen (MOTRIN) tablet 400 mg  400 mg Oral Q8H PRN    magnesium hydroxide (MILK OF MAGNESIA) 400 mg/5 mL oral suspension 30 mL  30 mL Oral DAILY PRN      SCHEDULED MEDICATIONS  Current Facility-Administered Medications   Medication Dose Route Frequency                ASSESSMENT & PLAN        The patient Aleah Fields is a 23 y.o.  female who presents at this time for treatment of the following diagnoses:  Patient Active Hospital Problem List:   Depression (6/29/2017)    Assessment: sadness, hopelessness, helplessness, poor energy and insomnia     Plan: start antidepressant          In summary, Aleah Fields presents with a severe exacerbation of the principal diagnosis, Depression    While on the unit Clara Orourke will be provided with individual, milieu, occupational, group, and substance abuse therapies to address target symptoms as deemed appropriate for the individual patient. I agree with decision to admit patient. I have spoken to [facility] psychiatric /ED staff regarding the nature of patients's admission at this time. A coordinated, multidisplinary treatment team (includes the nurse, unit pharmcist,  and writer) round was conducted for this initial evaluation with the patient present. The following regarding medications was addressed during rounds with patient:   the risks and benefits of the proposed medication. The patient was given the opportunity to ask questions. Informed consent given to the use of the above medications. I will continue to adjust psychiatric and non-psychiatric medications (see above \"medication\" section and orders section for details) as deemed appropriate & based upon diagnoses and response to treatment. I have reviewed admission (and previous/old) labs and medical tests in the EHR and or transferring hospital documents. I will continue to order blood tests/labs and diagnostic tests as deemed appropriate and review results as they become available (see orders for details). I have reviewed old psychiatric and medical records available in the EHR. I Will order additional psychiatric records from other institutions to further elucidate the nature of patient's psychopathology and review once available. I will gather additional collateral information from friends, family and o/p treatment team to further elucidate the nature of patient's psychopathology and baselline level of psychiatric functioning.         ESTIMATED LENGTH OF STAY:   3-5 days       STRENGTHS:  Exercising self-direction/Resourceful, Access to housing/residential stability and Interpersonal/supportive relationships (family, friends, peers)                      SIGNED:    Duane Ortiz MD  6/30/2017

## 2017-06-30 NOTE — PROGRESS NOTES
Admission Medication Reconciliation:    Information obtained from: Patient    Significant PMH/Disease States:   No past medical history on file. Chief Complaint for this Admission:  Mental Health    Allergies:  Review of patient's allergies indicates no known allergies. Prior to Admission Medications:   Prior to Admission Medications   Prescriptions Last Dose Informant Patient Reported? Taking? therapeutic multivitamin [facility]) tablet   Yes Yes   Sig: Take 1 Tab by mouth daily. Facility-Administered Medications: None         Comments/Recommendations: Added multivitamin.

## 2017-06-30 NOTE — BH NOTES
0015 ADMISSION    Pt is a voluntary admission for SI. Worsening past few weeks due to job, family, and school stress. Pt denies current SI, HI, AVH. Contracts for safety. UDS, BAL negative. Denies ETOH, drugs, and nicotine use. PTA meds completed and verified by pharmacist.     Pt is calm and cooperative. Changed into gown. No needs expressed. Will monitor with Q 15 safety checks.

## 2017-06-30 NOTE — BH NOTES
GROUP THERAPY PROGRESS NOTE    Clara Orourke did not participate in an Afternoon Healthy Expressions Group from 3 - 4 PM on Thursday, 6/29, with a focus on identifying feelings, reading selected poetry, responding, and writing for the day. This is a late note.

## 2017-06-30 NOTE — PROGRESS NOTES
[facility] 60  Master Treatment Plan for Kraena Thomas    Date Treatment Plan Initiated: 6/30/17    Treatment Plan Modalities:  Type of Modality Amount  (x minutes) Frequency (x/week) Duration (x days) Name of Responsible Staff   Community & wrap-up meetings to encourage peer interactions 15 7 1 Karey Manriquez     Group psychotherapy to assist in building coping skills and internal controls 60 7 1 Aakash Butler   Therapeutic activity groups to build coping skills 60 7 1 Aakash Butler   Psychoeducation in group setting to address:   Medication education   61 [address] Sascha [address] 1397 skills         Relaxation techniques         Symptom management         Discharge planning   61 2 [address]   Spirituality    61 2 1 150 [facility] 66   60 1 1 volunteer   Recovery/AA/NA         Physician medication management   13 7 1 Dr. Terrence Short   Family meeting/discharge planning                                              Problem: Depressed Mood (Adult/Pediatric) these goals will be met by 7/3/17  Goal: *STG: Verbalizes anger, guilt, and other feelings in a constructive manor  Outcome: Progressing Towards Goal  Easily shares with staff her feelings of guilt, worthlessness, resentment at times, anxiety about unknown and future specifically to her family  Goal: Interventions  Outcome: Progressing Towards Goal  Staff focus is on coping skills education  Goal: *STG: DEMONSTRATES REDUCTION IN SYMPTOMS AND INCREASE IN INSIGHT INTO COPING SKILLS/FUTURE FOCUSED  Outcome: Progressing Towards Goal  Denies SI, no self harming behaviors, insight into lack of effective healthy coping skills and chronic use of ineffective skills such as withdrawing to self, negative self talk. Demonstrates motivation to learn new skills and practice healthy skills learned in groups and from staff. Goal: *Participates in Treatment Plan  Outcome: Progressing Towards Goal  Review meds, out on unit passively engaged [facility] peers.  Appears sad at times with affect full range, mood is sad, anxious and worthless.  Pt daily goal is to talk with tx team.

## 2017-07-01 PROCEDURE — 74011250637 HC RX REV CODE- 250/637: Performed by: PSYCHIATRY & NEUROLOGY

## 2017-07-01 PROCEDURE — 65220000003 HC RM SEMIPRIVATE PSYCH

## 2017-07-01 RX ADMIN — HYDROXYZINE HYDROCHLORIDE 10 MG: 10 TABLET, FILM COATED ORAL at 21:50

## 2017-07-01 RX ADMIN — SERTRALINE HYDROCHLORIDE 25 MG: 50 TABLET ORAL at 08:53

## 2017-07-01 NOTE — BH NOTES
Patient request to get her car keys out of the safe to give to her friend so her car can be moved. Key was given to patient and she gave them to her male friend to take home.

## 2017-07-01 NOTE — PROGRESS NOTES
Problem: Depressed Mood (Adult/Pediatric)  Goal: *STG: Verbalizes anger, guilt, and other feelings in a constructive manor  Outcome: Progressing Towards Goal  Pt able to verbalize feelings in a constructive manor. Goal: *LTG: Returns to previous level of functioning and participates with after care plan  Outcome: Progressing Towards Goal  Visible on the unit and interacting with peers. Seen playing board games on the unit. Goal: *LTG: Understands illness and can identify signs of relapse  Outcome: Progressing Towards Goal  Pt able to verbalize and understands signs of relapse. Stated \"I woke up feeling happy. It helped to be away from my phone and relax\".

## 2017-07-01 NOTE — INTERDISCIPLINARY ROUNDS
Behavioral Health Interdisciplinary Rounds     Patient Name: Lori Matthews  Age: 23 y.o.   Room/Bed:  748/  Primary Diagnosis: Depression   Admission Status: Voluntary     Readmission within 30 days: no  Power of  in place: no  Patient requires a blocked bed: no          Reason for blocked bed: n/a    VTE Prophylaxis: Not indicated  Mobility needs/Fall risk: no    Nutritional Plan: no  Consults: no         Labs/Testing due today?: no    Sleep hours:        Participation in Care/Groups:  yes  Medication Compliant?: Yes  PRNS (last 24 hours): None    Restraints (last 24 hours):  no  Substance Abuse:  no  CIWA (range last 24 hours):  COWS (range last 24 hours):   Alcohol screening (AUDIT) completed -  AUDIT Score: 0  If applicable, date SBIRT discussed in treatment team AND documented: n/a  Tobacco - patient is a smoker: no   Date tobacco education completed by RN: n/a  24 hour chart check complete: yes     Patient goal(s) for today: Engage weekend Tx Team  Treatment team focus/goals: Eval meds/ effectiveness   LOS:  1  Expected LOS:   Psychiatric Advanced Care Directives -     Name of Decision maker if patient has Psychiatric Care Directive   Patient was offered information   Financial concerns/prescription coverage:    Date of last family contact:       Family requesting physician contact today:    Discharge plan: Return  home      Outpatient provider(s): Dave Choi ( Therapist)     Participating treatment team members: Dr Fredy Torres and Fady Bonilla RN

## 2017-07-01 NOTE — BH NOTES
PSYCHIATRIC PROGRESS NOTE         Patient Name  Patrice Ramírez   Date of Birth 1995   [facility] [id number]   Medical Record Number  [id number]      Age  23 y.o. PCP None   Admit date:  6/29/2017    Room Number  748/02  @ [facility]   Date of Service  7/1/2017          PSYCHOTHERAPY SESSION NOTE:  Length of psychotherapy session: 15 minutes    Main condition/diagnosis/issues treated during session today, 7/1/2017 : mood and coping skills    I employed Cognitive Behavioral therapy techniques, Reality-Oriented psychotherapy, as well as supportive psychotherapy in regards to various ongoing psychosocial stressors, including the following: pre-admission and current problems; housing issues; occupational issues; academic issues; legal issues; medical issues; and stress of hospitalization. Interpersonal relationship issues and psychodynamic conflicts explored. Attempts made to alleviate maladaptive patterns. We, also, worked on issues of denial & effects of substance dependency/use     Overall, patient is not progressing    Treatment Plan Update (reviewed an updated 7/1/2017) : I will modify psychotherapy tx plan by implementing more stress management strategies, building upon cognitive behavioral techniques, increasing coping skills, as well as shoring up psychological defenses). An extended energy and skill set was needed to engage pt in psychotherapy due to some of the following: resistiveness, complexity, negativity, confrontational nature, hostile behaviors, and/or severe abnormalities in thought processes/psychosis resulting in the loss of expressive/receptive language communication skills. E & M PROGRESS NOTE:         HISTORY         HISTORY OF PRESENT ILLNESS/INTERVAL HISTORY:  (reviewed/updated 7/1/2017). CC: \"depression\".  Pt admitted under a voluntary basis for severe depression with suicidal ideations  an imminent danger to self and others and an inability to care for self. HISTORY OF PRESENT ILLNESS:    The patient, Ya Lam, is a 23 y.o. BLACK OR  female with a past psychiatric history significant for depression , who presents at this time with complaints of (and/or evidence of) the following emotional symptoms: depression. Additional symptomatology include anxiety. The above symptoms have been present for 3 weeks . These symptoms are of severe severity. These symptoms are constant in nature. The patient's condition has been precipitated by academic stress and psychosocial stressors (financial problems ). Patient's condition made worse by treatment noncompliance. UDS: negative; BAL=0. Ya Lam presents/reports/evidences the following emotional symptoms today, 7/1/2017:depression. The above symptoms have been present for 3 weeks . These symptoms are of moderate in  severity. The symptoms are intermittent/ fleeting in nature. Additional symptomatology and features include more calm,affect bright ,deneis suicidal thoughts ,depression improved. Sleep and appetite is good. Attedning groups. SIDE EFFECTS: (reviewed/updated 7/1/2017)  None reported or admitted to. ALLERGIES:(reviewed/updated 7/1/2017)  No Known Allergies   MEDICATIONS PRIOR TO ADMISSION:(reviewed/updated 7/1/2017)  Prescriptions Prior to Admission   Medication Sig    therapeutic multivitamin (THERAGRAN) tablet Take 1 Tab by mouth daily. Indications: VITAMIN DEFICIENCY PREVENTION      PAST MEDICAL HISTORY: Past medical history from the initial psychiatric evaluation has been reviewed (reviewed/updated 7/1/2017) with no additional updates (I asked patient and no additional past medical history provided). No past medical history on file. No past surgical history on file.    SOCIAL HISTORY: Social history from the initial psychiatric evaluation has been reviewed (reviewed/updated 7/1/2017) with no additional updates (I asked patient and no additional social history provided). Social History     Social History    Marital status: SINGLE     Spouse name: N/A    Number of children: N/A    Years of education: N/A     Occupational History    Not on file. Social History Main Topics    Smoking status: Never Smoker    Smokeless tobacco: Never Used    Alcohol use Not on file    Drug use: Not on file    Sexual activity: Not on file     Other Topics Concern    Not on file     Social History Narrative    23year old AA female admitted voluntarily for \"depression and anxiety\". Patient has never been admitted psychiatrically. She has had 2 sessions with an outpatient therapist. 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 She is single aND LIVES WITH A ROOM MATE. Pt. is from [address] [place] [address] [place]: Family history from the initial psychiatric evaluation has been reviewed (reviewed/updated 7/1/2017) with no additional updates (I asked patient and no additional family history provided). No family history on file. REVIEW OF SYSTEMS: (reviewed/updated 7/1/2017)  Appetite:good   Sleep: good  6.5 hours   All other Review of Systems: Psychological ROS: positive for - anxiety and depression  Respiratory ROS: no cough, shortness of breath, or wheezing  Cardiovascular ROS: no chest pain or dyspnea on exertion         [facility] (MSE):    MSE FINDINGS ARE WITHIN NORMAL LIMITS (WNL) UNLESS OTHERWISE STATED BELOW. ( ALL OF THE BELOW CATEGORIES OF THE MSE HAVE BEEN REVIEWED (reviewed 7/1/2017) AND UPDATED AS DEEMED APPROPRIATE )  General Presentation age appropriate, overweight, within normal Limits and wearing hospital gown, cooperative   Orientation oriented to time, place and person   Vital Signs  See below (reviewed 7/1/2017); Vital Signs (BP, Pulse, & Temp) are within normal limits if not listed below.    Gait and Station Stable/steady, no ataxia   Musculoskeletal System No extrapyramidal symptoms (EPS); no abnormal muscular movements or Tardive Dyskinesia (TD); muscle strength and tone are within normal limits   Language No aphasia or dysarthria   Speech:  normal pitch and normal volume   Thought Processes logical; normal rate of thoughts; good abstract reasoning/computation   Thought Associations goal directed   Thought Content free of delusions   Suicidal Ideations none   Homicidal Ideations none   Mood:  euthymic   Affect:  full range   Memory recent  intact   Memory remote:  intact   Concentration/Attention:  intact   Fund of Knowledge average   Insight:  fair   Reliability fair   Judgment:  fair          VITALS:     Patient Vitals for the past 24 hrs:   Temp Pulse Resp BP SpO2   07/01/17 1552 98.7 °F (37.1 °C) 74 16 96/63 99 %   07/01/17 1122 97.9 °F (36.6 °C) 70 16 113/66 -   07/01/17 0756 98.2 °F (36.8 °C) 64 16 133/69 -     Wt Readings from Last 3 Encounters:   06/30/17 81.2 kg (179 lb)     Temp Readings from Last 3 Encounters:   07/01/17 98.7 °F (37.1 °C)     BP Readings from Last 3 Encounters:   07/01/17 96/63     Pulse Readings from Last 3 Encounters:   07/01/17 74            DATA     LABORATORY DATA:(reviewed/updated 7/1/2017)  No results found for this or any previous visit (from the past 24 hour(s)). No results found for: VALF2, VALAC, VALP, VALPR, DS6, CRBAM, CRBAMP, CARB2, XCRBAM  No results found for: LITHM   RADIOLOGY REPORTS:(reviewed/updated 7/1/2017)  No results found.        MEDICATIONS     ALL MEDICATIONS:   Current Facility-Administered Medications   Medication Dose Route Frequency    ziprasidone (GEODON) 20 mg in sterile water (preservative free) 1 mL injection  20 mg IntraMUSCular BID PRN    OLANZapine (ZyPREXA) tablet 5 mg  5 mg Oral Q6H PRN    benztropine (COGENTIN) tablet 2 mg  2 mg Oral BID PRN    benztropine (COGENTIN) injection 2 mg  2 mg IntraMUSCular BID PRN    LORazepam (ATIVAN) injection 2 mg  2 mg IntraMUSCular Q4H PRN    zolpidem (AMBIEN) tablet 5 mg  5 mg Oral QHS PRN    acetaminophen (TYLENOL) tablet 650 mg  650 mg Oral Q4H PRN    ibuprofen (MOTRIN) tablet 400 mg  400 mg Oral Q8H PRN    magnesium hydroxide (MILK OF MAGNESIA) 400 mg/5 mL oral suspension 30 mL  30 mL Oral DAILY PRN    sertraline (ZOLOFT) tablet 25 mg  25 mg Oral DAILY    hydrOXYzine HCl (ATARAX) tablet 10 mg  10 mg Oral Q4H PRN      SCHEDULED MEDICATIONS:   Current Facility-Administered Medications   Medication Dose Route Frequency    sertraline (ZOLOFT) tablet 25 mg  25 mg Oral DAILY          ASSESSMENT & PLAN     DIAGNOSES REQUIRING ACTIVE TREATMENT AND MONITORING: (reviewed/updated 7/1/2017)  Patient Active Hospital Problem List:                 In summary, Koby Rice, is a 23 y.o.  female who presents with a severe exacerbation of the principal diagnosis of Depression  Patient's condition is  stable improving. Patient requires continued inpatient hospitalization for further stabilization, safety monitoring and medication management. I will continue to coordinate the provision of individual, milieu, occupational, group, and substance abuse therapies to address target symptoms/diagnoses as deemed appropriate for the individual patient. A coordinated, multidisplinary treatment team round was conducted with the patient (this team consists of the nurse, psychiatric unit pharmcist,  and writer). Complete current electronic health record for patient has been reviewed today including consultant notes, ancillary staff notes, nurses and psychiatric tech notes. Suicide risk assessment completed and patient deemed to be of low risk for suicide at this time. The following regarding medications was addressed during rounds with patient:   the risks and benefits of the proposed medication. The patient was given the opportunity to ask questions. Informed consent given to the use of the above medications.  Will continue to adjust psychiatric and non-psychiatric medications (see above \"medication\" section and orders section for details) as deemed appropriate & based upon diagnoses and response to treatment. I will continue to order blood tests/labs and diagnostic tests as deemed appropriate and review results as they become available (see orders for details and above listed lab/test results). I will order psychiatric records from previous [facility] hospitals to further elucidate the nature of patient's psychopathology and review once available. I will gather additional collateral information from friends, family and o/p treatment team to further elucidate the nature of patient's psychopathology and baselline level of psychiatric functioning. I certify that this patient's inpatient psychiatric hospital services furnished since the previous certification were, and continue to be, required for treatment that could reasonably be expected to improve the patient's condition, or for diagnostic study, and that the patient continues to need, on a daily basis, active treatment furnished directly by or requiring the supervision of inpatient psychiatric facility personnel. In addition, the hospital records show that services furnished were intensive treatment services, admission or related services, or equivalent services.     EXPECTED DISCHARGE DATE/DAY: TBD     DISPOSITION:  Home       Signed By:   Naren Greene MD  7/1/2017

## 2017-07-02 PROCEDURE — 74011250637 HC RX REV CODE- 250/637: Performed by: PSYCHIATRY & NEUROLOGY

## 2017-07-02 PROCEDURE — 65220000003 HC RM SEMIPRIVATE PSYCH

## 2017-07-02 RX ADMIN — SERTRALINE HYDROCHLORIDE 25 MG: 50 TABLET ORAL at 08:52

## 2017-07-02 NOTE — PROGRESS NOTES
Problem: Depressed Mood (Adult/Pediatric)  Goal: *STG: DEMONSTRATES REDUCTION IN SYMPTOMS AND INCREASE IN INSIGHT INTO COPING SKILLS/FUTURE FOCUSED  Outcome: Progressing Towards Goal  1530: Greeted patient on unit in room resting quietly. Appears in no acute distress. Will continue to monitor on Q 15 minute safety checks.

## 2017-07-02 NOTE — PROGRESS NOTES
Problem: Depressed Mood (Adult/Pediatric)  Goal: *STG: Verbalizes anger, guilt, and other feelings in a constructive manor  Outcome: Progressing Towards Goal  Pt able to verbalized feelings in a positive manor. Smiling and interacting with peers approprietly. Goal: *LTG: Returns to previous level of functioning and participates with after care plan  Outcome: Progressing Towards Goal  Denies feeling depressed at this time.

## 2017-07-02 NOTE — PROGRESS NOTES
Problem: Depressed Mood (Adult/Pediatric)  Goal: *LTG: Returns to previous level of functioning and participates with after care plan  Outcome: Progressing Towards Goal  Received appears to sleep, respiratory even and unlabored. NAD noted. Will continue to monitor patient for safety q15 minutes. 0600 Patient appeared to sleep 7.5 hours. NAD noted.

## 2017-07-02 NOTE — BH NOTES
GROUP THERAPY PROGRESS NOTE    Breana Desir is participating in Coping Skills Group. Group time: 30 minutes    Personal goal for participation: Patient made a goal to \"participate in groups\"    Goal orientation: personal    Group therapy participation: active    Therapeutic interventions reviewed and discussed: Discuss handout on Stress Management and Community Meeting    Impression of participation: Participated entire group. Able to discuss stress management. Pleasant.

## 2017-07-02 NOTE — PROGRESS NOTES
Problem: Depressed Mood (Adult/Pediatric)  Goal: *LTG: Returns to previous level of functioning and participates with after care plan  Patient is alert, calm and cooperative. Visible in the milieu. Flat sad affect. No distress noted. Will continue to monitor. 2153 - Patient is sitting in the dinning room; playing card with peers. Bright cheerful affect. Meds and meals compliant. Will continue to monitor.

## 2017-07-03 VITALS
OXYGEN SATURATION: 100 % | HEART RATE: 73 BPM | TEMPERATURE: 98.2 F | BODY MASS INDEX: 33.38 KG/M2 | WEIGHT: 181.4 LBS | RESPIRATION RATE: 16 BRPM | HEIGHT: 62 IN | DIASTOLIC BLOOD PRESSURE: 76 MMHG | SYSTOLIC BLOOD PRESSURE: 113 MMHG

## 2017-07-03 PROCEDURE — 74011250637 HC RX REV CODE- 250/637: Performed by: PSYCHIATRY & NEUROLOGY

## 2017-07-03 RX ORDER — SERTRALINE HYDROCHLORIDE 25 MG/1
25 TABLET, FILM COATED ORAL DAILY
Qty: 30 TAB | Refills: 0 | Status: SHIPPED | OUTPATIENT
Start: 2017-07-03 | End: 2020-02-11

## 2017-07-03 RX ORDER — HYDROXYZINE HYDROCHLORIDE 10 MG/1
10 TABLET, FILM COATED ORAL
Qty: 30 TAB | Refills: 0 | Status: SHIPPED | OUTPATIENT
Start: 2017-07-03 | End: 2017-07-13

## 2017-07-03 RX ADMIN — SERTRALINE HYDROCHLORIDE 25 MG: 50 TABLET ORAL at 08:53

## 2017-07-03 NOTE — BH NOTES
GROUP THERAPY PROGRESS NOTE    Ya Mccollum participated in a Morning Process Group on the General Unit with a focus on identifying feelings, planning for the day, and learning more about DBT concepts of \"Mindfulness. \"     Group time: 60 minutes. Personal goal for participation: To identify feelings and increase the capacity to manage ones ability to cope. Goal orientation: The patient will be able to introduce themselves to their peers, identify their feelings, and consider the importance of coping skill development. The group session included a didactic section and the opportunity for patients to respond. Group therapy participation: This patient actively participated in the therapy session. Therapeutic interventions reviewed and discussed: The patients were encouraged to identify themselves by their first names, acknowledge their feelings, and define a goal for their day. This was followed by a didactic session on DBT mindsets. These concepts included:   1) One-Mindfully: In the moment on the front and the following suggestions on the back of the card: a) Just do one thing at a time; b) Let go of distractions; c) Come back to the moment and what you are doing; and d) Do each thing with all your attention. 2) Effectiveness: Focus on what works: a) Do what needs to be done; b) Play by the rules (I am not an exception) and consider the context; c) Act skillfully within the given situation; d) Keep an eye on your objectives (and do what is necessary); e) Let go of vengeance, useless anger, and the need to be righteous. 3) Observe: Just notice: a) Have a Bro mind; b) Control your attention, cling to nothing; c) Be alert; d) Step inside and observe; e) Watch thoughts come and go; f) Notice what flows through your senses.    4) Nonjudgmental Stance: a) Be aware but dont evaluate; b) Sort opinions from facts; c) Accept each moment; d) Acknowledge the helpful and the harmful, but dont  it; e) Dont  your judging. 5) Wise Mind: a) Integration of emotion mind and reasonable mind; b) Allows intuition; c) Find it in the belly, the center of your head, or by following your breath. At the end of the session all the group members were provided a summary of the topics discussed and a worksheet to review on their own time. Impression of participation: The patient said she was feeling better and ready to be going home and following up with her aftercare plans. Her affect was mildly euphoric and she was actually smiling and conversing with her peers. This patient listened to the didactic portion of the session and contributed to the discussion. The patient expressed no SI/HI nor overt psychotic symptoms in group. She expressed a sense of confidence in her ability to follow through with her aftercare plans.

## 2017-07-03 NOTE — BH NOTES
PSYCHIATRIC PROGRESS NOTE         Patient Name  Cesar Sen   Date of Birth 1995   [facility] [id number]   Medical Record Number  [id number]      Age  23 y.o. PCP None   Admit date:  6/29/2017    Room Number  748/02  @ [facility]   Date of Service  7/2/2017          PSYCHOTHERAPY SESSION NOTE:  Length of psychotherapy session: 15 minutes    Main condition/diagnosis/issues treated during session today, 7/2/2017 : mood and coping skills    I employed Cognitive Behavioral therapy techniques, Reality-Oriented psychotherapy, as well as supportive psychotherapy in regards to various ongoing psychosocial stressors, including the following: pre-admission and current problems; housing issues; occupational issues; academic issues; legal issues; medical issues; and stress of hospitalization. Interpersonal relationship issues and psychodynamic conflicts explored. Attempts made to alleviate maladaptive patterns. We, also, worked on issues of denial & effects of substance dependency/use     Overall, patient is not progressing    Treatment Plan Update (reviewed an updated 7/2/2017) : I will modify psychotherapy tx plan by implementing more stress management strategies, building upon cognitive behavioral techniques, increasing coping skills, as well as shoring up psychological defenses). An extended energy and skill set was needed to engage pt in psychotherapy due to some of the following: resistiveness, complexity, negativity, confrontational nature, hostile behaviors, and/or severe abnormalities in thought processes/psychosis resulting in the loss of expressive/receptive language communication skills. E & M PROGRESS NOTE:         HISTORY         HISTORY OF PRESENT ILLNESS/INTERVAL HISTORY:  (reviewed/updated 7/2/2017). CC: \"depression\".  Pt admitted under a voluntary basis for severe depression with suicidal ideations  an imminent danger to self and others and an inability to care for self. HISTORY OF PRESENT ILLNESS:    The patient, Gavino Russ, is a 23 y.o. BLACK OR  female with a past psychiatric history significant for depression , who presents at this time with complaints of (and/or evidence of) the following emotional symptoms: depression. Additional symptomatology include anxiety. The above symptoms have been present for 3 weeks . These symptoms are of severe severity. These symptoms are constant in nature. The patient's condition has been precipitated by academic stress and psychosocial stressors (financial problems ). Patient's condition made worse by treatment noncompliance. UDS: negative; BAL=0. Gavino Russ presents/reports/evidences the following emotional symptoms today, 7/2/2017:depression. The above symptoms have been present for 3 weeks . These symptoms are of moderate in  severity. The symptoms are intermittent/ fleeting in nature. Additional symptomatology and features include more calm,affect bright ,deneis suicidal thoughts ,depression improved. Sleep and appetite is good. Attedning groups. SIDE EFFECTS: (reviewed/updated 7/2/2017)  None reported or admitted to. ALLERGIES:(reviewed/updated 7/2/2017)  No Known Allergies   MEDICATIONS PRIOR TO ADMISSION:(reviewed/updated 7/2/2017)  Prescriptions Prior to Admission   Medication Sig    therapeutic multivitamin (THERAGRAN) tablet Take 1 Tab by mouth daily. Indications: VITAMIN DEFICIENCY PREVENTION      PAST MEDICAL HISTORY: Past medical history from the initial psychiatric evaluation has been reviewed (reviewed/updated 7/2/2017) with no additional updates (I asked patient and no additional past medical history provided). No past medical history on file. No past surgical history on file.    SOCIAL HISTORY: Social history from the initial psychiatric evaluation has been reviewed (reviewed/updated 7/2/2017) with no additional updates (I asked patient and no additional social history provided). Social History     Social History    Marital status: SINGLE     Spouse name: N/A    Number of children: N/A    Years of education: N/A     Occupational History    Not on file. Social History Main Topics    Smoking status: Never Smoker    Smokeless tobacco: Never Used    Alcohol use Not on file    Drug use: Not on file    Sexual activity: Not on file     Other Topics Concern    Not on file     Social History Narrative    23year old AA female admitted voluntarily for \"depression and anxiety\". Patient has never been admitted psychiatrically. She has had 2 sessions with an outpatient therapist. 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 She is single aND LIVES WITH A ROOM MATE. Pt. is from [address] [place] [address]: Family history from the initial psychiatric evaluation has been reviewed (reviewed/updated 7/2/2017) with no additional updates (I asked patient and no additional family history provided). No family history on file. REVIEW OF SYSTEMS: (reviewed/updated 7/2/2017)  Appetite:good   Sleep: good  6.5 hours   All other Review of Systems: Psychological ROS: positive for - anxiety and depression  Respiratory ROS: no cough, shortness of breath, or wheezing  Cardiovascular ROS: no chest pain or dyspnea on exertion         [facility] (MSE):    MSE FINDINGS ARE WITHIN NORMAL LIMITS (WNL) UNLESS OTHERWISE STATED BELOW. ( ALL OF THE BELOW CATEGORIES OF THE MSE HAVE BEEN REVIEWED (reviewed 7/2/2017) AND UPDATED AS DEEMED APPROPRIATE )  General Presentation age appropriate, overweight, within normal Limits and wearing hospital gown, cooperative   Orientation oriented to time, place and person   Vital Signs  See below (reviewed 7/2/2017); Vital Signs (BP, Pulse, & Temp) are within normal limits if not listed below.    Gait and Station Stable/steady, no ataxia   Musculoskeletal System No extrapyramidal symptoms (EPS); no abnormal muscular movements or Tardive Dyskinesia (TD); muscle strength and tone are within normal limits   Language No aphasia or dysarthria   Speech:  normal pitch and normal volume   Thought Processes logical; normal rate of thoughts; good abstract reasoning/computation   Thought Associations goal directed   Thought Content free of delusions   Suicidal Ideations none   Homicidal Ideations none   Mood:  euthymic   Affect:  full range   Memory recent  intact   Memory remote:  intact   Concentration/Attention:  intact   Fund of Knowledge average   Insight:  fair   Reliability fair   Judgment:  fair          VITALS:     Patient Vitals for the past 24 hrs:   Temp Pulse Resp BP SpO2   07/02/17 2015 98.3 °F (36.8 °C) 76 16 131/74 -   07/02/17 1600 99.2 °F (37.3 °C) 75 16 100/68 96 %   07/02/17 1101 98.5 °F (36.9 °C) 76 16 114/72 -   07/02/17 0737 98.1 °F (36.7 °C) 74 18 124/79 100 %     Wt Readings from Last 3 Encounters:   07/02/17 82.3 kg (181 lb 6.4 oz)     Temp Readings from Last 3 Encounters:   07/02/17 98.3 °F (36.8 °C)     BP Readings from Last 3 Encounters:   07/02/17 131/74     Pulse Readings from Last 3 Encounters:   07/02/17 76            DATA     LABORATORY DATA:(reviewed/updated 7/2/2017)  No results found for this or any previous visit (from the past 24 hour(s)). No results found for: VALF2, VALAC, VALP, VALPR, DS6, CRBAM, CRBAMP, CARB2, XCRBAM  No results found for: LITHM   RADIOLOGY REPORTS:(reviewed/updated 7/2/2017)  No results found.        MEDICATIONS     ALL MEDICATIONS:   Current Facility-Administered Medications   Medication Dose Route Frequency    ziprasidone (GEODON) 20 mg in sterile water (preservative free) 1 mL injection  20 mg IntraMUSCular BID PRN    OLANZapine (ZyPREXA) tablet 5 mg  5 mg Oral Q6H PRN    benztropine (COGENTIN) tablet 2 mg  2 mg Oral BID PRN    benztropine (COGENTIN) injection 2 mg  2 mg IntraMUSCular BID PRN    LORazepam (ATIVAN) injection 2 mg  2 mg IntraMUSCular Q4H PRN    zolpidem (AMBIEN) tablet 5 mg  5 mg Oral QHS PRN    acetaminophen (TYLENOL) tablet 650 mg  650 mg Oral Q4H PRN    ibuprofen (MOTRIN) tablet 400 mg  400 mg Oral Q8H PRN    magnesium hydroxide (MILK OF MAGNESIA) 400 mg/5 mL oral suspension 30 mL  30 mL Oral DAILY PRN    sertraline (ZOLOFT) tablet 25 mg  25 mg Oral DAILY    hydrOXYzine HCl (ATARAX) tablet 10 mg  10 mg Oral Q4H PRN      SCHEDULED MEDICATIONS:   Current Facility-Administered Medications   Medication Dose Route Frequency    sertraline (ZOLOFT) tablet 25 mg  25 mg Oral DAILY          ASSESSMENT & PLAN     DIAGNOSES REQUIRING ACTIVE TREATMENT AND MONITORING: (reviewed/updated 7/2/2017)  Patient Active Hospital Problem List:     Patient Active Hospital Problem List:   Depression (6/29/2017)    Assessment: sadness, hopelessness, helplessness, poor energy and insomnia     Plan: start antidepressant    07/02/17: Patient reported doing well. Mood and affect improved,no suicidal thoughts         In summary, Ivone Marion, is a 23 y.o.  female who presents with a severe exacerbation of the principal diagnosis of Depression  Patient's condition is  stable improving. Patient requires continued inpatient hospitalization for further stabilization, safety monitoring and medication management. I will continue to coordinate the provision of individual, milieu, occupational, group, and substance abuse therapies to address target symptoms/diagnoses as deemed appropriate for the individual patient. A coordinated, multidisplinary treatment team round was conducted with the patient (this team consists of the nurse, psychiatric unit pharmcist,  and writer). Complete current electronic health record for patient has been reviewed today including consultant notes, ancillary staff notes, nurses and psychiatric tech notes. Suicide risk assessment completed and patient deemed to be of low risk for suicide at this time.      The following regarding medications was addressed during rounds with patient:   the risks and benefits of the proposed medication. The patient was given the opportunity to ask questions. Informed consent given to the use of the above medications. Will continue to adjust psychiatric and non-psychiatric medications (see above \"medication\" section and orders section for details) as deemed appropriate & based upon diagnoses and response to treatment. I will continue to order blood tests/labs and diagnostic tests as deemed appropriate and review results as they become available (see orders for details and above listed lab/test results). I will order psychiatric records from previous [facility] hospitals to further elucidate the nature of patient's psychopathology and review once available. I will gather additional collateral information from friends, family and o/p treatment team to further elucidate the nature of patient's psychopathology and baselline level of psychiatric functioning. I certify that this patient's inpatient psychiatric hospital services furnished since the previous certification were, and continue to be, required for treatment that could reasonably be expected to improve the patient's condition, or for diagnostic study, and that the patient continues to need, on a daily basis, active treatment furnished directly by or requiring the supervision of inpatient psychiatric facility personnel. In addition, the hospital records show that services furnished were intensive treatment services, admission or related services, or equivalent services.     EXPECTED DISCHARGE DATE/DAY: TBD     DISPOSITION:  Home       Signed By:   Margie Stearns MD  7/2/2017

## 2017-07-03 NOTE — BH NOTES
Behavioral Health Transition Record to Provider    Patient Name: Stephany Price  YOB: 1995  Medical Record Number: [id number]  Date of Admission: 6/29/2017  Date of Discharge: 7/3/2017    Attending Provider: Deedee Vance MD  Discharging Provider: Deedee Vance MD  To contact this individual call [phone] and ask the  to page. If unavailable, ask to be transferred to [facility] Provider on call. [facility] Provider will be available on call 24/7 and during holidays. Primary Care Provider: None    No Known Allergies       H&P Summary Notes      H&P by Deedee Vance MD at 06/30/17 1212     Author:  Deedee Vance MD Service:  PSYCHIATRY Author Type:  Physician    Filed:  06/30/17 1503 Date of Service:  06/30/17 1212 Status:  Signed    :  Deedee Vance MD (Physician)             INITIAL PSYCHIATRIC EVALUATION         IDENTIFICATION:    Patient Name  Stephany Price   Date of Birth 1995   [facility] [id number]   Medical Record Number  [id number]      Age  23 y.o. PCP None   Admit date:  6/29/2017    Room Number  748/02  @ [facility]   Date of Service  6/30/2017            HISTORY         REASON FOR HOSPITALIZATION:  CC: \"depression\". Pt admitted under a voluntary basis for severe depression with suicidal ideations  an imminent danger to self and others and an inability to care for self. HISTORY OF PRESENT ILLNESS:    The patient, Stephany Price, is a 23 y.o. BLACK OR  female with a past psychiatric history significant for depression , who presents at this time with complaints of (and/or evidence of) the following emotional symptoms: depression. Additional symptomatology include anxiety. The above symptoms have been present for 3 weeks . These symptoms are of severe severity. These symptoms are constant in nature. The patient's condition has been precipitated by academic stress and psychosocial stressors (financial problems ). Patient's condition made worse by treatment noncompliance. UDS: negative; BAL=0. ALLERGIES:  No Known Allergies   MEDICATIONS PRIOR TO ADMISSION:  Prescriptions Prior to Admission   Medication Sig    therapeutic multivitamin (THERAGRAN) tablet Take 1 Tab by mouth daily. Indications: VITAMIN DEFICIENCY PREVENTION      PAST MEDICAL HISTORY:  No past medical history on file. No past surgical history on file. SOCIAL HISTORY:    Social History     Social History    Marital status: SINGLE     Spouse name: N/A    Number of children: N/A    Years of education: N/A     Occupational History    Not on file. Social History Main Topics    Smoking status: Not on file    Smokeless tobacco: Not on file    Alcohol use Not on file    Drug use: Not on file    Sexual activity: Not on file     Other Topics Concern    Not on file     Social History Narrative    23year old AA female admitted voluntarily for \"depression and anxiety\". Patient has never been admitted psychiatrically. She has had 2 sessions with an outpatient therapist. 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 She is single aND LIVES WITH A ROOM MATE. Pt. is from [address] [place] [address]: History reviewed. No pertinent family history. No family history on file. REVIEW OF SYSTEMS:[MH1.1]   Psychological ROS: positive for - depression  Respiratory ROS: no cough, shortness of breath, or wheezing  Cardiovascular ROS: no chest pain or dyspnea on exertion[MH1.2]  Pertinent items are noted in the History of Present Illness. All other Systems reviewed and are considered negative.            MENTAL STATUS EXAM & VITALS         MENTAL STATUS EXAM (MSE):    MSE FINDINGS ARE WITHIN NORMAL LIMITS (WNL) UNLESS OTHERWISE STATED BELOW. ( ALL OF THE BELOW CATEGORIES OF THE MSE HAVE BEEN REVIEWED (reviewed 6/30/2017) AND UPDATED AS DEEMED APPROPRIATE )  General Presentation[MH1.1] age appropriate[MH1.2],[MH1.1] cooperative[MH1.2]   Orientation[MH1.1] oriented to time, place and person[MH1.2]   Vital Signs  See below (reviewed 6/30/2017); Vital Signs (BP, Pulse, & Temp) are within normal limits if not listed below. Gait and Station Stable/steady, no ataxia   Musculoskeletal System No extrapyramidal symptoms (EPS); no abnormal muscular movements or Tardive Dyskinesia (TD); muscle strength and tone are within normal limits   Language No aphasia or dysarthria   Speech:[MH1.1]  monotone[MH1.2]   Thought Processes logical;[MH1.1] normal rate of thoughts[MH1.2]; [MH1.1] poor abstract reasoning/computation[MH1.2]   Thought Associations[MH1.1] goal directed[MH1.2]   Thought Content[MH1.1] free of delusions[MH1.2]   Suicidal Ideations[MH1.1] none[MH1.2]   Homicidal Ideations[MH1.1] none[MH1.2]   Mood:[MH1.1]  anxious[MH1.2]    Affect:[MH1.1]  mood-congruent[MH1.2]   Memory recent[MH1.1]  fair[MH1.2]   Memory remote:[MH1.1]  fair[MH1.2]   Concentration/Attention:[MH1.1]  hypervigilance[MH1.2]   Fund of Knowledge[MH1.1] average[MH1.2]   Insight:[MH1.1]  poor[MH1.2]   Reliability[MH1.1] fair[MH1.2]   Judgment:[MH1.1]  limited[MH1.2]            VITALS:     Patient Vitals for the past 24 hrs:   Temp Pulse Resp BP SpO2   06/30/17 0808 98.3 °F (36.8 °C) 83 16 115/73 100 %   06/30/17 0015 98.6 °F (37 °C) 78 16 130/80 98 %   06/29/17 1817 99 °F (37.2 °C) 76 18 113/71 99 %     Wt Readings from Last 3 Encounters:   06/30/17 81.2 kg (179 lb)     Temp Readings from Last 3 Encounters:   06/30/17 98.3 °F (36.8 °C)     BP Readings from Last 3 Encounters:   06/30/17 115/73     Pulse Readings from Last 3 Encounters:   06/30/17 83            DATA       LABORATORY DATA:  Labs Reviewed   METABOLIC PANEL, COMPREHENSIVE - Abnormal; Notable for the following:        Result Value    Globulin 4.3 (*)     A-G Ratio 0.8 (*)     All other components within normal limits   URINALYSIS W/MICROSCOPIC - Abnormal; Notable for the following:     Bacteria 1+ (*)     All other components within normal limits   CBC WITH AUTOMATED DIFF   DRUG SCREEN, URINE   ETHYL ALCOHOL   SAMPLES BEING HELD   TSH 3RD GENERATION   HCG URINE, QL. - POC   POC URINE PREGNANCY TEST     Admission on 06/29/2017   Component Date Value Ref Range Status    WBC 06/29/2017 5.6  3.6 - 11.0 K/uL Final    RBC 06/29/2017 4.47  3.80 - 5.20 M/uL Final    HGB 06/29/2017 13.8  11.5 - 16.0 g/dL Final    HCT 06/29/2017 39.3  35.0 - 47.0 % Final    MCV 06/29/2017 87.9  80.0 - 99.0 FL Final    MCH 06/29/2017 30.9  26.0 - 34.0 PG Final    MCHC 06/29/2017 35.1  30.0 - 36.5 g/dL Final    RDW 06/29/2017 12.2  11.5 - 14.5 % Final    PLATELET 58/39/1479 964  150 - 400 K/uL Final    NEUTROPHILS 06/29/2017 50  32 - 75 % Final    LYMPHOCYTES 06/29/2017 40  12 - 49 % Final    MONOCYTES 06/29/2017 9  5 - 13 % Final    EOSINOPHILS 06/29/2017 1  0 - 7 % Final    BASOPHILS 06/29/2017 0  0 - 1 % Final    ABS. NEUTROPHILS 06/29/2017 2.7  1.8 - 8.0 K/UL Final    ABS. LYMPHOCYTES 06/29/2017 2.2  0.8 - 3.5 K/UL Final    ABS. MONOCYTES 06/29/2017 0.5  0.0 - 1.0 K/UL Final    ABS. EOSINOPHILS 06/29/2017 0.1  0.0 - 0.4 K/UL Final    ABS. BASOPHILS 06/29/2017 0.0  0.0 - 0.1 K/UL Final    Sodium 06/29/2017 138  136 - 145 mmol/L Final    Potassium 06/29/2017 4.0  3.5 - 5.1 mmol/L Final    Chloride 06/29/2017 104  97 - 108 mmol/L Final    CO2 06/29/2017 27  21 - 32 mmol/L Final    Anion gap 06/29/2017 7  5 - 15 mmol/L Final    Glucose 06/29/2017 99  65 - 100 mg/dL Final    BUN 06/29/2017 12  6 - 20 MG/DL Final    Creatinine 06/29/2017 0.75  0.55 - 1.02 MG/DL Final    BUN/Creatinine ratio 06/29/2017 16  12 - 20   Final    GFR est AA 06/29/2017 >60  >60 ml/min/1.73m2 Final    GFR est non-AA 06/29/2017 >60  >60 ml/min/1.73m2 Final    Calcium 06/29/2017 9.4  8.5 - 10.1 MG/DL Final    Bilirubin, total 06/29/2017 0.3  0.2 - 1.0 MG/DL Final    ALT (SGPT) 06/29/2017 19  12 - 78 U/L Final    AST (SGOT) 06/29/2017 16  15 - 37 U/L Final    Alk.  phosphatase 06/29/2017 67 45 - 117 U/L Final    Protein, total 06/29/2017 7.8  6.4 - 8.2 g/dL Final    Albumin 06/29/2017 3.5  3.5 - 5.0 g/dL Final    Globulin 06/29/2017 4.3* 2.0 - 4.0 g/dL Final    A-G Ratio 06/29/2017 0.8* 1.1 - 2.2   Final    AMPHETAMINE 06/29/2017 NEGATIVE   NEG   Final    BARBITURATES 06/29/2017 NEGATIVE   NEG   Final    BENZODIAZEPINE 06/29/2017 NEGATIVE   NEG   Final    COCAINE 06/29/2017 NEGATIVE   NEG   Final    METHADONE 06/29/2017 NEGATIVE   NEG   Final    OPIATES 06/29/2017 NEGATIVE   NEG   Final    PCP(PHENCYCLIDINE) 06/29/2017 NEGATIVE   NEG   Final    THC (TH-CANNABINOL) 06/29/2017 NEGATIVE   NEG   Final    Drug screen comment 06/29/2017 (NOTE)   Final    ALCOHOL(ETHYL),SERUM 06/29/2017 <10  <10 MG/DL Final    Color 06/29/2017 YELLOW/STRAW    Final    Appearance 06/29/2017 CLEAR  CLEAR   Final    Specific gravity 06/29/2017 1.018  1.003 - 1.030   Final    pH (UA) 06/29/2017 7.5  5.0 - 8.0   Final    Protein 06/29/2017 NEGATIVE   NEG mg/dL Final    Glucose 06/29/2017 NEGATIVE   NEG mg/dL Final    Ketone 06/29/2017 NEGATIVE   NEG mg/dL Final    Bilirubin 06/29/2017 NEGATIVE   NEG   Final    Blood 06/29/2017 NEGATIVE   NEG   Final    Urobilinogen 06/29/2017 0.2  0.2 - 1.0 EU/dL Final    Nitrites 06/29/2017 NEGATIVE   NEG   Final    Leukocyte Esterase 06/29/2017 NEGATIVE   NEG   Final    WBC 06/29/2017 0-4  0 - 4 /hpf Final    RBC 06/29/2017 0-5  0 - 5 /hpf Final    Epithelial cells 06/29/2017 FEW  FEW /lpf Final    Bacteria 06/29/2017 1+* NEG /hpf Final    Hyaline cast 06/29/2017 0-2  0 - 5 /lpf Final    Pregnancy test,urine (POC) 06/29/2017 NEGATIVE   NEG   Final    TSH 06/29/2017 1.03  0.36 - 3.74 uIU/mL Final        RADIOLOGY REPORTS:  No results found for this or any previous visit. No results found.            MEDICATIONS       ALL MEDICATIONS  Current Facility-Administered Medications   Medication Dose Route Frequency    ziprasidone (GEODON) 20 mg in sterile water (preservative free) 1 mL injection  20 mg IntraMUSCular BID PRN    OLANZapine (ZyPREXA) tablet 5 mg  5 mg Oral Q6H PRN    benztropine (COGENTIN) tablet 2 mg  2 mg Oral BID PRN    benztropine (COGENTIN) injection 2 mg  2 mg IntraMUSCular BID PRN    LORazepam (ATIVAN) injection 2 mg  2 mg IntraMUSCular Q4H PRN    LORazepam (ATIVAN) tablet 1 mg  1 mg Oral Q4H PRN    zolpidem (AMBIEN) tablet 5 mg  5 mg Oral QHS PRN    acetaminophen (TYLENOL) tablet 650 mg  650 mg Oral Q4H PRN    ibuprofen (MOTRIN) tablet 400 mg  400 mg Oral Q8H PRN    magnesium hydroxide (MILK OF MAGNESIA) 400 mg/5 mL oral suspension 30 mL  30 mL Oral DAILY PRN      SCHEDULED MEDICATIONS  Current Facility-Administered Medications   Medication Dose Route Frequency                ASSESSMENT & PLAN        The patient Koby Rice is a 23 y.o.  female who presents at this time for treatment of the following diagnoses:  Patient Active Hospital Problem List:   Depression (6/29/2017)    Assessment:[MH1.1] sadness, hopelessness, helplessness, poor energy and insomnia[MH1.2]     Plan:[MH1.1] start antidepressant[MH1.2]          In summary, Koby Rice presents with a severe exacerbation of the principal diagnosis, Depression    While on the unit Koby Rice will be provided with individual, milieu, occupational, group, and substance abuse therapies to address target symptoms as deemed appropriate for the individual patient. I agree with decision to admit patient. I have spoken to [facility] psychiatric /ED staff regarding the nature of patients's admission at this time. A coordinated, multidisplinary treatment team (includes the nurse, unit pharmcist,  and writer) round was conducted for this initial evaluation with the patient present. The following regarding medications was addressed during rounds with patient:   the risks and benefits of the proposed medication.  The patient was given the opportunity to ask questions. Informed consent given to the use of the above medications. I will continue to adjust psychiatric and non-psychiatric medications (see above \"medication\" section and orders section for details) as deemed appropriate & based upon diagnoses and response to treatment. I have reviewed admission (and previous/old) labs and medical tests in the EHR and or transferring hospital documents. I will continue to order blood tests/labs and diagnostic tests as deemed appropriate and review results as they become available (see orders for details). I have reviewed old psychiatric and medical records available in the EHR. I Will order additional psychiatric records from other institutions to further elucidate the nature of patient's psychopathology and review once available. I will gather additional collateral information from friends, family and o/p treatment team to further elucidate the nature of patient's psychopathology and baselline level of psychiatric functioning.         ESTIMATED LENGTH OF STAY:[MH1.1]   3-5[MH1.2] days       STRENGTHS:[MH1.1]  Exercising self-direction/Resourceful, Access to housing/residential stability and Interpersonal/supportive relationships (family, friends, peers)[MH1.2]                      SIGNED:    Betty Presley MD  6/30/2017[MH1.1]                  Revision History       User Key Date/Time User Provider Type Action    > MH1.2 06/30/17 1503 Betty Presley MD Physician Sign     MH1.1 06/30/17 1212 Betty Presley MD Physician               Admission Diagnosis: Depression    * No surgery found *    Results for orders placed or performed during the hospital encounter of 06/29/17   CBC WITH AUTOMATED DIFF   Result Value Ref Range    WBC 5.6 3.6 - 11.0 K/uL    RBC 4.47 3.80 - 5.20 M/uL    HGB 13.8 11.5 - 16.0 g/dL    HCT 39.3 35.0 - 47.0 %    MCV 87.9 80.0 - 99.0 FL    MCH 30.9 26.0 - 34.0 PG    MCHC 35.1 30.0 - 36.5 g/dL    RDW 12.2 11.5 - 14.5 %    PLATELET 345 214 - 099 K/uL    NEUTROPHILS 50 32 - 75 %    LYMPHOCYTES 40 12 - 49 %    MONOCYTES 9 5 - 13 %    EOSINOPHILS 1 0 - 7 %    BASOPHILS 0 0 - 1 %    ABS. NEUTROPHILS 2.7 1.8 - 8.0 K/UL    ABS. LYMPHOCYTES 2.2 0.8 - 3.5 K/UL    ABS. MONOCYTES 0.5 0.0 - 1.0 K/UL    ABS. EOSINOPHILS 0.1 0.0 - 0.4 K/UL    ABS. BASOPHILS 0.0 0.0 - 0.1 K/UL   METABOLIC PANEL, COMPREHENSIVE   Result Value Ref Range    Sodium 138 136 - 145 mmol/L    Potassium 4.0 3.5 - 5.1 mmol/L    Chloride 104 97 - 108 mmol/L    CO2 27 21 - 32 mmol/L    Anion gap 7 5 - 15 mmol/L    Glucose 99 65 - 100 mg/dL    BUN 12 6 - 20 MG/DL    Creatinine 0.75 0.55 - 1.02 MG/DL    BUN/Creatinine ratio 16 12 - 20      GFR est AA >60 >60 ml/min/1.73m2    GFR est non-AA >60 >60 ml/min/1.73m2    Calcium 9.4 8.5 - 10.1 MG/DL    Bilirubin, total 0.3 0.2 - 1.0 MG/DL    ALT (SGPT) 19 12 - 78 U/L    AST (SGOT) 16 15 - 37 U/L    Alk.  phosphatase 67 45 - 117 U/L    Protein, total 7.8 6.4 - 8.2 g/dL    Albumin 3.5 3.5 - 5.0 g/dL    Globulin 4.3 (H) 2.0 - 4.0 g/dL    A-G Ratio 0.8 (L) 1.1 - 2.2     DRUG SCREEN, URINE   Result Value Ref Range    AMPHETAMINE NEGATIVE  NEG      BARBITURATES NEGATIVE  NEG      BENZODIAZEPINE NEGATIVE  NEG      COCAINE NEGATIVE  NEG      METHADONE NEGATIVE  NEG      OPIATES NEGATIVE  NEG      PCP(PHENCYCLIDINE) NEGATIVE  NEG      THC (TH-CANNABINOL) NEGATIVE  NEG      Drug screen comment (NOTE)    ETHYL ALCOHOL   Result Value Ref Range    ALCOHOL(ETHYL),SERUM <10 <10 MG/DL   URINALYSIS W/MICROSCOPIC   Result Value Ref Range    Color YELLOW/STRAW      Appearance CLEAR CLEAR      Specific gravity 1.018 1.003 - 1.030      pH (UA) 7.5 5.0 - 8.0      Protein NEGATIVE  NEG mg/dL    Glucose NEGATIVE  NEG mg/dL    Ketone NEGATIVE  NEG mg/dL    Bilirubin NEGATIVE  NEG      Blood NEGATIVE  NEG      Urobilinogen 0.2 0.2 - 1.0 EU/dL    Nitrites NEGATIVE  NEG      Leukocyte Esterase NEGATIVE  NEG      WBC 0-4 0 - 4 /hpf    RBC 0-5 0 - 5 /hpf    Epithelial cells FEW FEW /lpf Bacteria 1+ (A) NEG /hpf    Hyaline cast 0-2 0 - 5 /lpf   TSH 3RD GENERATION   Result Value Ref Range    TSH 1.03 0.36 - 3.74 uIU/mL   HCG URINE, QL. - POC   Result Value Ref Range    Pregnancy test,urine (POC) NEGATIVE  NEG         Immunizations administered during this encounter: There is no immunization history on file for this patient. Screening for Metabolic Disorders for Patients on Antipsychotic Medications    Estimated Body Mass Index  Estimated body mass index is 33.18 kg/(m^2) as calculated from the following:    Height as of this encounter: 5' 2\" (1.575 m). Weight as of this encounter: 82.3 kg (181 lb 6.4 oz). Vital Signs/Blood Pressure  Visit Vitals    /76    Pulse 73    Temp 98.2 °F (36.8 °C)    Resp 16    Ht 5' 2\" (1.575 m)    Wt 82.3 kg (181 lb 6.4 oz)    SpO2 100%    BMI 33.18 kg/m2       Blood Glucose/Hemoglobin A1c  Lab Results   Component Value Date/Time    Glucose 99 2017 07:27 PM        No results found for: HBA1C, HGBE8, TPJ6MWFC     Lipid Panel  No results found for: CHOL, CHOLX, CHLST, CHOLV, 872975, HDL, LDL, LDLC, DLDLP, TGLX, TRIGL, TRIGP, CHHD, CHHDX         Discharge Diagnosis: Depression (ICD-10-CM: F32.9; ICD-9-CM: 311)    Discharge Plan:   Maurice Settler  : 1995  MRN: [id number]    The patient Grupo Rose exhibits the ability to control behavior in a less restrictive environment. Patient's level of functioning is improving. No assaultive/destructive behavior has been observed for the past 24 hours. No suicidal/homicidal threat or behavior has been observed for the past 24 hours. There is no evidence of serious medication side effects. Patient has not been in physical or protective restraints for at least the past 24 hours. If weapons involved, how are they secured? No weapons involved. Is patient aware of and in agreement with discharge plan?  Yes    Arrangements for medication:  Prescriptions given to patient. Copy of discharge instructions to  provider?:  Yes, [facility] ([phone])    Arrangements for transportation home:  Catrachita Mujica to friend's home    Keep all follow up appointments as scheduled, continue to take prescribed medications per physician instructions. Mental health crisis number:  785 or your local mental health crisis line number at [phone] [facility] [place] [facility] or [phone] ([place]). Discharge Medication List and Instructions:   Discharge Medication List as of 7/3/2017  2:18 PM      START taking these medications    Details   sertraline (ZOLOFT) 25 mg tablet Take 1 Tab by mouth daily. Indications: ANXIETY WITH DEPRESSION, Print, Disp-30 Tab, R-0      hydrOXYzine HCl (ATARAX) 10 mg tablet Take 1 Tab by mouth every four (4) hours as needed (Anxiety) for up to 10 days. Indications: anxiety, Print, Disp-30 Tab, R-0         CONTINUE these medications which have NOT CHANGED    Details   therapeutic multivitamin (THERAGRAN) tablet Take 1 Tab by mouth daily. Indications: VITAMIN DEFICIENCY PREVENTION, Historical Med             Unresulted Labs     None        To obtain results of studies pending at discharge, please contact N/A    Follow-up Information     Follow up With Details Comments [address]  Please call to schedule an appointment with your therapist. The office wishes to schedule appointments directly with the client. [email or web address]  [address]  [phone], [phone] Kimberly Rodríguez On 7/5/2017 You have at 10:00am appointment with a physician to become enrolled in [facility] health services. After this appointment, you will be scheduled to meet with a psychiatrist in the practice. [facility]  [address] [facility] 57 [facility], 140[facility], [address] [place] [address]  [phone]    None   None (287) Patient stated that they have no PCP            Advanced Directive:   Does the patient have an appointed surrogate decision maker? No  Does the patient have a Medical Advance Directive? No  Does the patient have a Psychiatric Advance Directive? No  If the patient does not have a surrogate or Medical Advance Directive AND Psychiatric Advance Directive, the patient was offered information on these advance directives Yes and Patient declined to complete    Patient Instructions: Please continue all medications until otherwise directed by physician. What to do at Home:  Recommended activity: Activity as tolerated. If you experience any of the following symptoms thoughts of wanting to harm yourself or overwhelming anxiety, please follow up with 1 or your local mental health crisis line number at [phone] [facility] [place] [facility] or [phone] ([place]). *  Please give a list of your current medications to your Primary Care Provider. *  Please update this list whenever your medications are discontinued, doses are      changed, or new medications (including over-the-counter products) are added. *  Please carry medication information at all times in case of emergency situations. Tobacco Cessation Discharge Plan:   Is the patient a smoker and needs referral for smoking cessation? No  Patient referred to the following for smoking cessation with an appointment? Not applicable     Patient was offered medication to assist with smoking cessation at discharge? Not applicable  Was education for smoking cessation added to the discharge instructions? Not applicable    Alcohol/Substance Abuse Discharge Plan:   Does the patient have a history of substance/alcohol abuse and requires a referral for treatment? No  Patient referred to the following for substance/alcohol abuse treatment with an appointment? Not applicable  Patient was offered medication to assist with alcohol cessation at discharge? Not applicable  Was education for substance/alcohol abuse added to discharge instructions?  Not applicable    Patient discharged to Home; discussed with patient/caregiver, provided to the patient/caregiver either in hard copy or electronically. and patient refused hard copy.

## 2017-07-03 NOTE — DISCHARGE INSTRUCTIONS
DISCHARGE SUMMARY    Tamara Begum  : 1995  MRN: [id number]    The patient Jenifer Anderson exhibits the ability to control behavior in a less restrictive environment. Patient's level of functioning is improving. No assaultive/destructive behavior has been observed for the past 24 hours. No suicidal/homicidal threat or behavior has been observed for the past 24 hours. There is no evidence of serious medication side effects. Patient has not been in physical or protective restraints for at least the past 24 hours. If weapons involved, how are they secured? No weapons involved. Is patient aware of and in agreement with discharge plan? Yes    Arrangements for medication:  Prescriptions given to patient. Copy of discharge instructions to  provider?:  Yes, [address] [place] [address] ([phone])    Arrangements for transportation home:  Williamia Orlin to friend's home    Keep all follow up appointments as scheduled, continue to take prescribed medications per physician instructions. Mental health crisis number:  [phone] or your local mental health crisis line number at [phone] [facility] or [phone] ([place]). DISCHARGE SUMMARY from Nurse    The following personal items are in your possession at time of discharge:    Dental Appliances: None  Visual Aid: None     Home Medications: None  Jewelry: None  Clothing: Undergarments (8 undies)  Other Valuables: Personal toiletries, Other (comment) (notebook,laquita, toothbr/paste,face wipes)  Personal Items Sent to Safe: none          PATIENT INSTRUCTIONS:      What to do at Home:  Recommended activity: Activity as tolerated. If you experience any of the following symptoms thoughts of wanting to harm yourself or overwhelming anxiety, please follow up with 911 or your local mental health crisis line number at [phone] [facility] or [phone] ([place]). *  Please give a list of your current medications to your Primary Care Provider.     *  Please update this list whenever your medications are discontinued, doses are      changed, or new medications (including over-the-counter products) are added. *  Please carry medication information at all times in case of emergency situations. These are general instructions for a healthy lifestyle:    No smoking/ No tobacco products/ Avoid exposure to second hand smoke    Surgeon General's Warning:  Quitting smoking now greatly reduces serious risk to your health. Obesity, smoking, and sedentary lifestyle greatly increases your risk for illness    A healthy diet, regular physical exercise & weight monitoring are important for maintaining a healthy lifestyle    You may be retaining fluid if you have a history of heart failure or if you experience any of the following symptoms:  Weight gain of 3 pounds or more overnight or 5 pounds in a week, increased swelling in our hands or feet or shortness of breath while lying flat in bed. Please call your doctor as soon as you notice any of these symptoms; do not wait until your next office visit. Recognize signs and symptoms of STROKE:    F-face looks uneven    A-arms unable to move or move unevenly    S-speech slurred or non-existent    T-time-call 911 as soon as signs and symptoms begin-DO NOT go       Back to bed or wait to see if you get better-TIME IS BRAIN. Warning Signs of HEART ATTACK     Call 911 if you have these symptoms:   Chest discomfort. Most heart attacks involve discomfort in the center of the chest that lasts more than a few minutes, or that goes away and comes back. It can feel like uncomfortable pressure, squeezing, fullness, or pain.  Discomfort in other areas of the upper body. Symptoms can include pain or discomfort in one or both arms, the back, neck, jaw, or stomach.  Shortness of breath with or without chest discomfort.  Other signs may include breaking out in a cold sweat, nausea, or lightheadedness.   Don't wait more than five minutes to call 911 - MINUTES MATTER! Fast action can save your life. Calling 911 is almost always the fastest way to get lifesaving treatment. Emergency Medical Services staff can begin treatment when they arrive -- up to an hour sooner than if someone gets to the hospital by car. The discharge information has been reviewed with the patient. The patient verbalized understanding. Discharge medications reviewed with the patient and appropriate educational materials and side effects teaching were provided.

## 2017-07-03 NOTE — PROGRESS NOTES
Pharmacist Discharge Medication Reconciliation    Discharging Provider: Dr. Beto Beltran PMH: No past medical history on file. Chief Complaint for this Admission:   Chief Complaint   Patient presents with    Suicidal     Allergies: Review of patient's allergies indicates no known allergies. Discharge Medications:   Current Discharge Medication List        START taking these medications    Details   sertraline (ZOLOFT) 25 mg tablet Take 1 Tab by mouth daily. Indications: ANXIETY WITH DEPRESSION  Qty: 30 Tab, Refills: 0      hydrOXYzine HCl (ATARAX) 10 mg tablet Take 1 Tab by mouth every four (4) hours as needed (Anxiety) for up to 10 days. Indications: anxiety  Qty: 30 Tab, Refills: 0           CONTINUE these medications which have NOT CHANGED    Details   therapeutic multivitamin (THERAGRAN) tablet Take 1 Tab by mouth daily.  Indications: VITAMIN DEFICIENCY PREVENTION             The patient's chart, MAR and AVS were reviewed by Danielle Santillan, NICHOLASD.

## 2017-07-03 NOTE — DISCHARGE SUMMARY
PSYCHIATRIC DISCHARGE SUMMARY         IDENTIFICATION:    Patient Name  Clara Orourke   Date of Birth 1995   [facility] [id number]   Medical Record Number  [id number]      Age  23 y.o. PCP None   Admit date:  6/29/2017    Discharge date: 7/3/2017   Room Number  748/02  @ [facility]   Date of Service  7/3/2017               TYPE OF DISCHARGE: REGULAR               CONDITION AT DISCHARGE: good and improved       PROVISIONAL & DISCHARGE DIAGNOSES:    Problem List  Never Reviewed          Codes Class    * (Principal)Depression ICD-10-CM: F32.9  ICD-9-CM: 1325 [facility] 6 Problems    *Depression        DISCHARGE DIAGNOSIS:   Axis I:  SEE ABOVE  Axis II: SEE ABOVE  Axis III: SEE ABOVE  Axis IV:  lack of structure  Axis V:  60 on admission, 70 on discharge 70(baseline)       CC & HISTORY OF PRESENT ILLNESS:  23year old AA female admitted voluntarily with depression and SI (no intent). Pt had symptomatic remission on antidepressants. At discharge she denied SI/HI intent and plan. SOCIAL HISTORY:    Social History     Social History    Marital status: SINGLE     Spouse name: N/A    Number of children: N/A    Years of education: N/A     Occupational History    Not on file. Social History Main Topics    Smoking status: Never Smoker    Smokeless tobacco: Never Used    Alcohol use Not on file    Drug use: Not on file    Sexual activity: Not on file     Other Topics Concern    Not on file     Social History Narrative    23year old AA female admitted voluntarily for \"depression and anxiety\". Patient has never been admitted psychiatrically. She has had 2 sessions with an outpatient therapist. 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 She is single aND LIVES WITH A ROOM MATE. Pt. is from [address] [place] [address]:   No family history on file.           HOSPITALIZATION COURSE:    Clara Orourke was admitted to the inpatient psychiatric unit [facility] for acute psychiatric stabilization in regards to symptomatology as described in the HPI above. The differential diagnosis at time of admission included: depression . While on the unit Antonia Jarquin was involved in individual, group, occupational and milieu therapy. Psychiatric medications were adjusted during this hospitalization including zoloft. Antonia Jarquin demonstrated a slow, but progressive improvement in overall condition. Much of patient's depression appeared to be related to situational stressors and psychological factors. Please see individual progress notes for more specific details regarding patient's hospitalization course. At time of dc, Antonia Jarquin was without significant problems with depression psychosis  clayton. Overall presentation at time of discharge is most consistent with the diagnosis of adjustment disorder with depressed mood. Patient with request for discharge today. There are no grounds to seek a TDO. Patient has maximized benefit to be derived from acute inpatient psychiatric treatment.   All members of the treatment team concur with each other in regards to plans for discharge today per patient's request.          LABS AND IMAGAING:    Labs Reviewed   METABOLIC PANEL, COMPREHENSIVE - Abnormal; Notable for the following:        Result Value    Globulin 4.3 (*)     A-G Ratio 0.8 (*)     All other components within normal limits   URINALYSIS W/MICROSCOPIC - Abnormal; Notable for the following:     Bacteria 1+ (*)     All other components within normal limits   CBC WITH AUTOMATED DIFF   DRUG SCREEN, URINE   ETHYL ALCOHOL   SAMPLES BEING HELD   TSH 3RD GENERATION   HCG URINE, QL. - POC   POC URINE PREGNANCY TEST     Admission on 06/29/2017   Component Date Value Ref Range Status    WBC 06/29/2017 5.6  3.6 - 11.0 K/uL Final    RBC 06/29/2017 4.47  3.80 - 5.20 M/uL Final    HGB 06/29/2017 13.8  11.5 - 16.0 g/dL Final    HCT 06/29/2017 39.3  35.0 - 47.0 % Final    MCV 06/29/2017 87.9  80.0 - 99.0 FL Final    MCH 06/29/2017 30.9  26.0 - 34.0 PG Final    MCHC 06/29/2017 35.1  30.0 - 36.5 g/dL Final    RDW 06/29/2017 12.2  11.5 - 14.5 % Final    PLATELET 85/04/4634 075  150 - 400 K/uL Final    NEUTROPHILS 06/29/2017 50  32 - 75 % Final    LYMPHOCYTES 06/29/2017 40  12 - 49 % Final    MONOCYTES 06/29/2017 9  5 - 13 % Final    EOSINOPHILS 06/29/2017 1  0 - 7 % Final    BASOPHILS 06/29/2017 0  0 - 1 % Final    ABS. NEUTROPHILS 06/29/2017 2.7  1.8 - 8.0 K/UL Final    ABS. LYMPHOCYTES 06/29/2017 2.2  0.8 - 3.5 K/UL Final    ABS. MONOCYTES 06/29/2017 0.5  0.0 - 1.0 K/UL Final    ABS. EOSINOPHILS 06/29/2017 0.1  0.0 - 0.4 K/UL Final    ABS. BASOPHILS 06/29/2017 0.0  0.0 - 0.1 K/UL Final    Sodium 06/29/2017 138  136 - 145 mmol/L Final    Potassium 06/29/2017 4.0  3.5 - 5.1 mmol/L Final    Chloride 06/29/2017 104  97 - 108 mmol/L Final    CO2 06/29/2017 27  21 - 32 mmol/L Final    Anion gap 06/29/2017 7  5 - 15 mmol/L Final    Glucose 06/29/2017 99  65 - 100 mg/dL Final    BUN 06/29/2017 12  6 - 20 MG/DL Final    Creatinine 06/29/2017 0.75  0.55 - 1.02 MG/DL Final    BUN/Creatinine ratio 06/29/2017 16  12 - 20   Final    GFR est AA 06/29/2017 >60  >60 ml/min/1.73m2 Final    GFR est non-AA 06/29/2017 >60  >60 ml/min/1.73m2 Final    Calcium 06/29/2017 9.4  8.5 - 10.1 MG/DL Final    Bilirubin, total 06/29/2017 0.3  0.2 - 1.0 MG/DL Final    ALT (SGPT) 06/29/2017 19  12 - 78 U/L Final    AST (SGOT) 06/29/2017 16  15 - 37 U/L Final    Alk.  phosphatase 06/29/2017 67  45 - 117 U/L Final    Protein, total 06/29/2017 7.8  6.4 - 8.2 g/dL Final    Albumin 06/29/2017 3.5  3.5 - 5.0 g/dL Final    Globulin 06/29/2017 4.3* 2.0 - 4.0 g/dL Final    A-G Ratio 06/29/2017 0.8* 1.1 - 2.2   Final    AMPHETAMINE 06/29/2017 NEGATIVE   NEG   Final    BARBITURATES 06/29/2017 NEGATIVE   NEG   Final    BENZODIAZEPINE 06/29/2017 NEGATIVE   NEG   Final    COCAINE 06/29/2017 NEGATIVE   NEG   Final    METHADONE 06/29/2017 NEGATIVE   NEG   Final    OPIATES 06/29/2017 NEGATIVE   NEG   Final    PCP(PHENCYCLIDINE) 06/29/2017 NEGATIVE   NEG   Final    THC (TH-CANNABINOL) 06/29/2017 NEGATIVE   NEG   Final    Drug screen comment 06/29/2017 (NOTE)   Final    ALCOHOL(ETHYL),SERUM 06/29/2017 <10  <10 MG/DL Final    Color 06/29/2017 YELLOW/STRAW    Final    Appearance 06/29/2017 CLEAR  CLEAR   Final    Specific gravity 06/29/2017 1.018  1.003 - 1.030   Final    pH (UA) 06/29/2017 7.5  5.0 - 8.0   Final    Protein 06/29/2017 NEGATIVE   NEG mg/dL Final    Glucose 06/29/2017 NEGATIVE   NEG mg/dL Final    Ketone 06/29/2017 NEGATIVE   NEG mg/dL Final    Bilirubin 06/29/2017 NEGATIVE   NEG   Final    Blood 06/29/2017 NEGATIVE   NEG   Final    Urobilinogen 06/29/2017 0.2  0.2 - 1.0 EU/dL Final    Nitrites 06/29/2017 NEGATIVE   NEG   Final    Leukocyte Esterase 06/29/2017 NEGATIVE   NEG   Final    WBC 06/29/2017 0-4  0 - 4 /hpf Final    RBC 06/29/2017 0-5  0 - 5 /hpf Final    Epithelial cells 06/29/2017 FEW  FEW /lpf Final    Bacteria 06/29/2017 1+* NEG /hpf Final    Hyaline cast 06/29/2017 0-2  0 - 5 /lpf Final    Pregnancy test,urine (POC) 06/29/2017 NEGATIVE   NEG   Final    TSH 06/29/2017 1.03  0.36 - 3.74 uIU/mL Final     No results found. DISPOSITION:    Home. Patient to f/u with o/p psychiatric, and psychotherapy appointments. Patient is to f/u with internist as directed. FOLLOW-UP CARE:    Activity as tolerated  Regular Diet  Wound Care: none needed. Follow-up Information     Follow up With Details Comments [facility]  [address]  [phone]       [address] [place] [address], [address] [place] [address], [address] [place] [address]  [phone]    None   None (802) Patient stated that they have no PCP                   PROGNOSIS:  Greatly dependent upon patient's ability to remain sober and to f/u with o/p psychiatric/psychotherapy appointments as well as to comply with psychiatric medications as prescribed. Patient denies suicidal or homicidal ideations. [facility] fully contracts for safety. Patient reports many positive predictive factors in terms of not attempting suicide or homicide. Patient appears to be at low risk of suicide or homicide. Patient and family are aware and in agreement with discharge and discharge plan. DISCHARGE MEDICATIONS: (no changes made). Informed consent given for the use of following psychotropic medications:  Current Discharge Medication List      START taking these medications    Details   sertraline (ZOLOFT) 25 mg tablet Take 1 Tab by mouth daily. Indications: ANXIETY WITH DEPRESSION  Qty: 30 Tab, Refills: 0      hydrOXYzine HCl (ATARAX) 10 mg tablet Take 1 Tab by mouth every four (4) hours as needed (Anxiety) for up to 10 days. Indications: anxiety  Qty: 30 Tab, Refills: 0         CONTINUE these medications which have NOT CHANGED    Details   therapeutic multivitamin (THERAGRAN) tablet Take 1 Tab by mouth daily. Indications: VITAMIN DEFICIENCY PREVENTION                    A coordinated, multidisplinary treatment team round was conducted with Mimi Chavez---this is done daily here at [facility] . This team consists of the nurse, psychiatric unit pharmcist,  and writer. I have spent greater than 35 minutes on discharge work. Signed:  Samuel Alfredo MD  7/3/2017    A coordinated, multidisplinary treatment team round was conducted with Mimi Chavez---this is done daily here at [facility] . This team consists of the nurse, psychiatric unit pharmcist,  and writer. I have spent greater than 35 minutes on discharge work.     Signed:  Samuel Alfredo MD  7/3/2017

## 2017-07-03 NOTE — INTERDISCIPLINARY ROUNDS
Behavioral Health Interdisciplinary Rounds     Patient Name: Derek Castellon  Age: 23 y.o.   Room/Bed:  748/  Primary Diagnosis: Depression   Admission Status: Voluntary     Readmission within 30 days: no  Power of  in place: no  Patient requires a blocked bed: no          Reason for blocked bed:     VTE Prophylaxis: Not indicated  Mobility needs/Fall risk: no    Nutritional Plan: no  Consults: no         Labs/Testing due today?: no    Sleep hours:         Participation in Care/Groups:  yes  Medication Compliant?: Yes  PRNS (last 24 hours): None    Restraints (last 24 hours):  no  Substance Abuse:  no  CIWA (range last 24 hours):  COWS (range last 24 hours):   Alcohol screening (AUDIT) completed -  AUDIT Score: 0  If applicable, date SBIRT discussed in treatment team AND documented:   Tobacco - patient is a smoker: no   Date tobacco education completed by RN:   24 hour chart check complete: yes     Patient goal(s) for today:   Treatment team focus/goals:   LOS:  3  Expected LOS:   Psychiatric Advanced Care Directives -    Name of Decision maker if patient has Psychiatric Care Directive   Patient was offered information   Financial concerns/prescription coverage:    Date of last family contact:       Family requesting physician contact today:    Discharge plan:        Outpatient provider(s):     Participating treatment team members: Derek Castellon, * (assigned SW),

## 2017-07-03 NOTE — BH NOTES
Pt discharge by uber. No distress noted. Denies SI/HI at this time. All belongings returned to pt. Discharge instructions reviewed and signed with pt.

## 2017-07-03 NOTE — BH NOTES
2320 Pt appears asleep in bed. Respirations even and unlabored. Will continue to monitor with Q 15 safety checks.

## 2017-07-03 NOTE — PROGRESS NOTES
Problem: Depressed Mood (Adult/Pediatric)  Goal: *STG: Verbalizes anger, guilt, and other feelings in a constructive manor  Outcome: Progressing Towards Goal  Pt able to verbalize feelings in a constructive manor. Denies SI/HI at this time. Plan to have pt discharged today.

## 2020-02-07 ENCOUNTER — OFFICE VISIT (OUTPATIENT)
Dept: INTERNAL MEDICINE CLINIC | Age: 25
End: 2020-02-07

## 2020-02-07 VITALS
RESPIRATION RATE: 16 BRPM | HEART RATE: 68 BPM | HEIGHT: 62 IN | WEIGHT: 151 LBS | SYSTOLIC BLOOD PRESSURE: 99 MMHG | BODY MASS INDEX: 27.79 KG/M2 | DIASTOLIC BLOOD PRESSURE: 64 MMHG | TEMPERATURE: 97.9 F

## 2020-02-07 DIAGNOSIS — F32.A DEPRESSION, UNSPECIFIED DEPRESSION TYPE: Primary | ICD-10-CM

## 2020-02-07 RX ORDER — ESCITALOPRAM OXALATE 10 MG/1
10 TABLET ORAL DAILY
Qty: 30 TAB | Refills: 3 | Status: SHIPPED | OUTPATIENT
Start: 2020-02-07

## 2020-02-07 NOTE — PROGRESS NOTES
Subjective:      Kaye Horner is a 27 y.o. female who presents today for No chief complaint on file. Patient in today for establishment     She recently graduated from [employer]. 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 She was prescribed zoloft but never took it. Her therapist is grecia montejo. grecia [facility] and is seen once per week. Has lost weight in past, sleeps poorly off and on, feels restless in morning, poorly motivated, affects concentration, not as motivated to be social, does not like being around a lot of people, tearful at times. She feels hopeless. She is smoking weed and drinking daily to help with her issues. She has suicidal thoughts (denies plan) and has intense rage at times. Patient Active Problem List    Diagnosis Date Noted    Depression 06/29/2017     Current Outpatient Medications   Medication Sig Dispense Refill    sertraline (ZOLOFT) 25 mg tablet Take 1 Tab by mouth daily. Indications: ANXIETY WITH DEPRESSION 30 Tab 0    therapeutic multivitamin (THERAGRAN) tablet Take 1 Tab by mouth daily. Indications: VITAMIN DEFICIENCY PREVENTION       No Known Allergies  History reviewed. No pertinent past medical history. History reviewed. No pertinent surgical history. Family History   Problem Relation Age of Onset    No Known Problems Mother     No Known Problems Father      Social History     Tobacco Use    Smoking status: Never Smoker    Smokeless tobacco: Never Used   Substance Use Topics    Alcohol use: Yes        Review of Systems    A comprehensive review of systems was negative except for that written in the HPI.      Objective:     Visit Vitals  BP 99/64   Pulse 68   Temp 97.9 °F (36.6 °C) (Oral)   Resp 16   Ht 5' 2\" (1.575 m)   Wt 151 lb (68.5 kg)   LMP 02/05/2020 (Exact Date)   BMI 27.62 kg/m²     General:  Alert, cooperative, no distress, appears stated age. Head:  Normocephalic, without obvious abnormality, atraumatic. Eyes:  Conjunctivae/corneas clear. PERRL, EOMs intact. Fundi benign. Ears:  Normal TMs and external ear canals both ears. Nose: Nares normal. Septum midline. Mucosa normal. No drainage or sinus tenderness. Throat: Lips, mucosa, and tongue normal. Teeth and gums normal.   Neck: Supple, symmetrical, trachea midline, no adenopathy, thyroid: no enlargement/tenderness/nodules, no carotid bruit and no JVD. Back:   Symmetric, no curvature. ROM normal. No CVA tenderness. Lungs:   Clear to auscultation bilaterally. Chest wall:  No tenderness or deformity. Heart:  Regular rate and rhythm, S1, S2 normal, no murmur, click, rub or gallop. Abdomen:   Soft, non-tender. Bowel sounds normal. No masses,  No organomegaly. Extremities: Extremities normal, atraumatic, no cyanosis or edema. Pulses: 2+ and symmetric all extremities. Skin: Skin color, texture, turgor normal. No rashes or lesions. Lymph nodes: Cervical, supraclavicular, and axillary nodes normal.   Neurologic: CNII-XII intact. Normal strength, sensation and reflexes throughout. Assessment/Plan:       ICD-10-CM ICD-9-CM    1. Depression, unspecified depression type F32.9 311 REFERRAL TO PSYCHIATRY      REFERRAL TO PSYCHIATRY    Start Lexapro 10 mg po daily    Continue counseling    Will speak to family members and close friends if develops suicidal and or homicidal ideation and will go to ER asap    Contract for safety    Offered inpatient evaluation however patient declines at this time        Follow up in 2 weeks for follow up on depression and physical          Advised her to call back or return to office if symptoms worsen/change/persist.  Discussed expected course/resolution/complications of diagnosis in detail with patient.     Medication risks/benefits/costs/interactions/alternatives discussed with patient. She was given an after visit summary which includes diagnoses, current medications, & vitals. She expressed understanding with the diagnosis and plan.

## 2020-02-11 NOTE — PROGRESS NOTES
Psych- alert and oriented x 3, speech is clear and fluid, depressed mood and tearful, normal affect, judgement and insight are intact

## 2020-07-10 ENCOUNTER — VIRTUAL VISIT (OUTPATIENT)
Dept: INTERNAL MEDICINE CLINIC | Age: 25
End: 2020-07-10

## 2020-07-10 DIAGNOSIS — N64.4 BREAST PAIN: ICD-10-CM

## 2020-07-10 DIAGNOSIS — R30.0 DYSURIA: Primary | ICD-10-CM

## 2020-07-10 DIAGNOSIS — F32.A DEPRESSION, UNSPECIFIED DEPRESSION TYPE: ICD-10-CM

## 2020-07-10 RX ORDER — BUPROPION HYDROCHLORIDE 150 MG/1
150 TABLET ORAL
Qty: 30 TAB | Refills: 3 | Status: SHIPPED | OUTPATIENT
Start: 2020-07-10

## 2020-07-10 NOTE — PROGRESS NOTES
Assessment/Plan:       ICD-10-CM ICD-9-CM    1. Dysuria  R30.0 788.1 REFERRAL TO UROLOGY   2. Depression, unspecified depression type  F32.9 311 REFERRAL TO PSYCHIATRY  Start wellbutrin   3. Breast pain  N64.4 611.71 Will monitor over next 1-2 weeks if no improvement will see obgyn for exam            Advised her to call back or return to office if symptoms worsen/change/persist.  Discussed expected course/resolution/complications of diagnosis in detail with patient. Medication risks/benefits/costs/interactions/alternatives discussed with patient. She was given an after visit summary which includes diagnoses, current medications, & vitals. She expressed understanding with the diagnosis and plan. Subjective:   Patient in today for follow up on depression  She had been prescribed lexapro and was doing well  Therapist Lary Pat meets once per week  She stopped lexapro because she had started drinking and getting drunk every day  She also smokes a lot of weed  Drinks now 1-2 glasses a night of wine, smokes weed multiple times per day. Patient says she is very stressed right now by work and is unhappy with life. She is not suicidal or homicidal.  Denies being an alcoholic or needing a alcohol cessation program      After taking antibiotics for UTI patient developed yeast infection  2 weeks later symptoms recurred  Has left pelvic pain which is ongoing  Went back to obgyn and was rechecked no positive uti or STD neg  Persistent burning when urinates-       Patient has bilateral breast pain over last few days. She denies pregnancy  Menses are regular. No trauma, no nipple discharge, no redness or warmth,      Prior to Admission medications    Medication Sig Start Date End Date Taking? Authorizing Provider   escitalopram oxalate (LEXAPRO) 10 mg tablet Take 1 Tab by mouth daily. 2/7/20   Wili King MD         Review of Systems   Constitutional: Negative for weight loss.    Eyes: Negative for blurred vision. Respiratory: Negative for shortness of breath. Cardiovascular: Negative for chest pain and leg swelling. Genitourinary: Negative for frequency and urgency. Musculoskeletal: Negative for joint pain. Neurological: Negative for headaches. Objective:   No flowsheet data found. [INSTRUCTIONS:  \"[x]\" Indicates a positive item  \"[]\" Indicates a negative item  -- DELETE ALL ITEMS NOT EXAMINED]    Constitutional: [x] Appears well-developed and well-nourished [x] No apparent distress      [] Abnormal -     Mental status: [x] Alert and awake  [x] Oriented to person/place/time [x] Able to follow commands    [] Abnormal -     Eyes:   EOM    [x]  Normal    [] Abnormal -   Sclera  [x]  Normal    [] Abnormal -          Discharge [x]  None visible   [] Abnormal -     HENT: [x] Normocephalic, atraumatic  [] Abnormal -   [x] Mouth/Throat: Mucous membranes are moist    External Ears [x] Normal  [] Abnormal -    Neck: [x] No visualized mass [] Abnormal -     Pulmonary/Chest: [x] Respiratory effort normal   [x] No visualized signs of difficulty breathing or respiratory distress        [] Abnormal -      Musculoskeletal:   [x] Normal gait with no signs of ataxia         [x] Normal range of motion of neck        [] Abnormal -     Neurological:        [x] No Facial Asymmetry (Cranial nerve 7 motor function) (limited exam due to video visit)          [x] No gaze palsy        [] Abnormal -          Skin:        [x] No significant exanthematous lesions or discoloration noted on facial skin         [] Abnormal -            Psychiatric:       [x] Normal Affect [] Abnormal -        [x] No Hallucinations    Other pertinent observable physical exam findings:-        We discussed the expected course, resolution and complications of the diagnosis(es) in detail. Medication risks, benefits, costs, interactions, and alternatives were discussed as indicated.   I advised her to contact the office if her condition worsens, changes or fails to improve as anticipated. She expressed understanding with the diagnosis(es) and plan. Briana Arriaga, who was evaluated through a patient-initiated, synchronous (real-time) audio-video encounter, and/or her healthcare decision maker, is aware that it is a billable service, with coverage as determined by her insurance carrier. She provided verbal consent to proceed: Yes, and patient identification was verified. It was conducted pursuant to the emergency declaration under the [address] [place] [address] and the [employer] General Act. A caregiver was present when appropriate. Ability to conduct physical exam was limited. I was at home. The patient was at home.       Fiorella Roach MD

## 2020-07-10 NOTE — PROGRESS NOTES
Chief Complaint   Patient presents with    Pelvic Pain    Medication Evaluation     Pt presents for pelvic pain x 1 month. Pt would also like to discuss medication Lexapro. 1. Have you been to the ER, urgent care clinic since your last visit? Hospitalized since your last visit? No    2. Have you seen or consulted any other health care providers outside of the [address] [place] [address] since your last visit? Include any pap smears or colon screening. Yes, seen by Dr. Liliam Aceves 6/22 for Uti.

## 2024-10-16 NOTE — INTERDISCIPLINARY ROUNDS
Behavioral Health Interdisciplinary Rounds     Patient Name: Ivonne Delgado  Age: 23 y.o. Room/Bed:  748/  Primary Diagnosis: <principal problem not specified>   Admission Status: Voluntary     Readmission within 30 days: no  Power of  in place: no  Patient requires a blocked bed: no          Reason for blocked bed:     VTE Prophylaxis: Not indicated  Mobility needs/Fall risk: no    Nutritional Plan: no  Consults: no         Labs/Testing due today?: no    Sleep hours: 5.75 +        Participation in Care/Groups:    Medication Compliant?:   PRNS (last 24 hours): None    Restraints (last 24 hours):  no  Substance Abuse:  no  CIWA (range last 24 hours):  COWS (range last 24 hours):   Alcohol screening (AUDIT) completed -  AUDIT Score: 0  If applicable, date SBIRT discussed in treatment team AND documented: N/A  Tobacco - patient is a smoker: no   Date tobacco education completed by RN: N/A  24 hour chart check complete: yes     Patient goal(s) for today: attend all groups  Treatment team focus/goals: psychosocial  LOS:  0  Expected LOS: TBD  Psychiatric Advanced Care Directives -  No  Name of Decision maker if patient has Psychiatric Care Directive: None   Patient was offered information, patient declined.    Financial concerns/prescription coverage: [facility]  Date of last family contact: None      Family requesting physician contact today: No  Discharge plan: Return home when stable for discharge       Outpatient provider(s): Asya Duncan (therapist)    Participating treatment team members: Ivonne Delgado, MONICA Newby; Dr. Mona Bergman MD; Lex Baer RN; Brenda Mcconnell, PharmD; Pharmacy resident Yes